# Patient Record
Sex: MALE | Race: BLACK OR AFRICAN AMERICAN | NOT HISPANIC OR LATINO | URBAN - METROPOLITAN AREA
[De-identification: names, ages, dates, MRNs, and addresses within clinical notes are randomized per-mention and may not be internally consistent; named-entity substitution may affect disease eponyms.]

---

## 2022-08-21 ENCOUNTER — EMERGENCY (EMERGENCY)
Facility: HOSPITAL | Age: 52
LOS: 0 days | Discharge: HOME | End: 2022-08-21
Attending: STUDENT IN AN ORGANIZED HEALTH CARE EDUCATION/TRAINING PROGRAM

## 2022-08-21 VITALS
OXYGEN SATURATION: 99 % | HEART RATE: 76 BPM | SYSTOLIC BLOOD PRESSURE: 170 MMHG | DIASTOLIC BLOOD PRESSURE: 100 MMHG | TEMPERATURE: 98 F | RESPIRATION RATE: 16 BRPM | WEIGHT: 265 LBS

## 2022-08-21 VITALS
RESPIRATION RATE: 18 BRPM | OXYGEN SATURATION: 98 % | TEMPERATURE: 98 F | DIASTOLIC BLOOD PRESSURE: 89 MMHG | SYSTOLIC BLOOD PRESSURE: 164 MMHG | HEART RATE: 77 BPM

## 2022-08-21 DIAGNOSIS — M25.562 PAIN IN LEFT KNEE: ICD-10-CM
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
## 2022-08-21 PROCEDURE — 73562 X-RAY EXAM OF KNEE 3: CPT | Mod: 26,LT

## 2022-08-21 PROCEDURE — 73552 X-RAY EXAM OF FEMUR 2/>: CPT | Mod: 26,50

## 2022-08-21 PROCEDURE — 73590 X-RAY EXAM OF LOWER LEG: CPT | Mod: 26,LT

## 2022-08-21 PROCEDURE — 99284 EMERGENCY DEPT VISIT MOD MDM: CPT

## 2022-08-21 RX ORDER — IBUPROFEN 200 MG
1 TABLET ORAL
Qty: 30 | Refills: 0
Start: 2022-08-21 | End: 2022-08-25

## 2022-08-21 RX ORDER — IBUPROFEN 200 MG
600 TABLET ORAL ONCE
Refills: 0 | Status: COMPLETED | OUTPATIENT
Start: 2022-08-21 | End: 2022-08-21

## 2022-08-21 RX ADMIN — Medication 600 MILLIGRAM(S): at 18:06

## 2022-08-21 NOTE — ED PROVIDER NOTE - PHYSICAL EXAMINATION
GEN: Awake, alert, interactive, NAD.  HEAD AND NECK: NC/AT. Airway patent. Neck supple.   EYES:  Clear b/l. EOMI. PERRL.   ENT: Moist mucus membranes.   CARDIAC: Regular rate, regular rhythm. No evident pedal edema.    RESP/CHEST: Normal respiratory effort with no use of accessory muscles or retractions. Clear throughout on auscultation.  ABD: Soft, non-distended, non-tender. No rebound, no guarding.   BACK: No midline spinal TTP. No CVAT.   EXTREMITIES: LLE NVI, + TTP suprapatellar / medial / lateral knee, no TTP patella / inferior / posterior L knee.   SKIN: Warm, dry, intact normal color. No rash.   NEURO: AOx3, CN II-XII grossly intact, no focal deficits.   PSYCH: Appropriate mood and affect.

## 2022-08-21 NOTE — ED PROVIDER NOTE - PATIENT PORTAL LINK FT
You can access the FollowMyHealth Patient Portal offered by [facility] by registering at the following website: [email or web address]. By joining [employer]’s FollowMyHealth portal, you will also be able to view your health information using other applications (apps) compatible with our system.

## 2022-08-21 NOTE — ED PROVIDER NOTE - CLINICAL SUMMARY MEDICAL DECISION MAKING FREE TEXT BOX
Otherwise healthy 52M pw L knee pain s/p basketball injury PTA. AF, VSS. Well appearing, in NAD. Exam as noted in PE. Plan: XR LLE, pain control. Re-eval.
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
## 2022-08-21 NOTE — ED ADULT TRIAGE NOTE - CHIEF COMPLAINT QUOTE
while playing basketball pt fell and hurt left knee. no deformity, increase pain wt bearing Ice applied in triage

## 2022-08-21 NOTE — ED PROVIDER NOTE - CARE PROVIDER_API CALL
iGgi Salamanca (DO)  Orthopaedic Surgery Surgery  [address] [facility], [address] [place] [address]  Phone: [phone]  Fax: [phone]  Follow Up Time: 7-10 Days

## 2022-08-21 NOTE — ED PROVIDER NOTE - PROGRESS NOTE DETAILS
XR imaging w/o evidence acute fracture or dislocation. On re-eval, resting comfortably, in NAD. Clinical presentation most c/w sprain. Ace wrap applied to L knee, given crutches for comfort / support. Stable for d/c home. Given script Motrin. Recommend continued NSAIDs, RICE. Given and instructed for outpatient Ortho f/u. Return signs / symptoms d/w pt, wife. They understand / agree w/ this plan.

## 2024-04-04 ENCOUNTER — INPATIENT (INPATIENT)
Facility: HOSPITAL | Age: 54
LOS: 10 days | Discharge: HOME | End: 2024-04-15
Attending: INTERNAL MEDICINE | Admitting: INTERNAL MEDICINE
Payer: COMMERCIAL

## 2024-04-04 VITALS
HEART RATE: 95 BPM | DIASTOLIC BLOOD PRESSURE: 94 MMHG | OXYGEN SATURATION: 99 % | TEMPERATURE: 99 F | SYSTOLIC BLOOD PRESSURE: 149 MMHG | RESPIRATION RATE: 20 BRPM

## 2024-04-04 DIAGNOSIS — R29.898 OTHER SYMPTOMS AND SIGNS INVOLVING THE MUSCULOSKELETAL SYSTEM: Chronic | ICD-10-CM
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
## 2024-04-04 DIAGNOSIS — Z79.899 OTHER LONG TERM (CURRENT) DRUG THERAPY: ICD-10-CM

## 2024-04-04 DIAGNOSIS — I26.99 OTHER PULMONARY EMBOLISM WITHOUT ACUTE COR PULMONALE: ICD-10-CM

## 2024-04-04 DIAGNOSIS — T14.8XXA OTHER INJURY OF UNSPECIFIED BODY REGION, INITIAL ENCOUNTER: ICD-10-CM

## 2024-04-04 DIAGNOSIS — L08.9 LOCAL INFECTION OF THE SKIN AND SUBCUTANEOUS TISSUE, UNSPECIFIED: ICD-10-CM
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
## 2024-04-04 LAB
ALBUMIN SERPL ELPH-MCNC: 3.7 G/DL — SIGNIFICANT CHANGE UP (ref 3.3–5)
ALP SERPL-CCNC: 286 U/L — HIGH (ref 40–120)
ALT FLD-CCNC: 27 U/L — SIGNIFICANT CHANGE UP (ref 4–41)
ANION GAP SERPL CALC-SCNC: 12 MMOL/L — SIGNIFICANT CHANGE UP (ref 7–14)
APTT BLD: 30.2 SEC — SIGNIFICANT CHANGE UP (ref 24.5–35.6)
AST SERPL-CCNC: 27 U/L — SIGNIFICANT CHANGE UP (ref 4–40)
BASE EXCESS BLDV CALC-SCNC: 1.2 MMOL/L — SIGNIFICANT CHANGE UP (ref -2–3)
BASOPHILS # BLD AUTO: 0.02 K/UL — SIGNIFICANT CHANGE UP (ref 0–0.2)
BASOPHILS NFR BLD AUTO: 0.2 % — SIGNIFICANT CHANGE UP (ref 0–2)
BILIRUB SERPL-MCNC: 0.7 MG/DL — SIGNIFICANT CHANGE UP (ref 0.2–1.2)
BUN SERPL-MCNC: 13 MG/DL — SIGNIFICANT CHANGE UP (ref 7–23)
CA-I SERPL-SCNC: 1.19 MMOL/L — SIGNIFICANT CHANGE UP (ref 1.15–1.33)
CALCIUM SERPL-MCNC: 9.3 MG/DL — SIGNIFICANT CHANGE UP (ref 8.4–10.5)
CHLORIDE BLDV-SCNC: 102 MMOL/L — SIGNIFICANT CHANGE UP (ref 96–108)
CHLORIDE SERPL-SCNC: 102 MMOL/L — SIGNIFICANT CHANGE UP (ref 98–107)
CO2 BLDV-SCNC: 27.3 MMOL/L — HIGH (ref 22–26)
CO2 SERPL-SCNC: 23 MMOL/L — SIGNIFICANT CHANGE UP (ref 22–31)
CREAT SERPL-MCNC: 0.9 MG/DL — SIGNIFICANT CHANGE UP (ref 0.5–1.3)
CRP SERPL-MCNC: 25.1 MG/L — HIGH
EGFR: 102 ML/MIN/1.73M2 — SIGNIFICANT CHANGE UP
EOSINOPHIL # BLD AUTO: 0.14 K/UL — SIGNIFICANT CHANGE UP (ref 0–0.5)
EOSINOPHIL NFR BLD AUTO: 1.5 % — SIGNIFICANT CHANGE UP (ref 0–6)
ERYTHROCYTE [SEDIMENTATION RATE] IN BLOOD: 92 MM/HR — HIGH (ref 1–15)
GAS PNL BLDV: 134 MMOL/L — LOW (ref 136–145)
GAS PNL BLDV: SIGNIFICANT CHANGE UP
GAS PNL BLDV: SIGNIFICANT CHANGE UP
GLUCOSE BLDV-MCNC: 101 MG/DL — HIGH (ref 70–99)
GLUCOSE SERPL-MCNC: 100 MG/DL — HIGH (ref 70–99)
HCO3 BLDV-SCNC: 26 MMOL/L — SIGNIFICANT CHANGE UP (ref 22–29)
HCT VFR BLD CALC: 34.4 % — LOW (ref 39–50)
HCT VFR BLDA CALC: 35 % — LOW (ref 39–51)
HGB BLD CALC-MCNC: 11.5 G/DL — LOW (ref 12.6–17.4)
HGB BLD-MCNC: 10.9 G/DL — LOW (ref 13–17)
IANC: 6.91 K/UL — SIGNIFICANT CHANGE UP (ref 1.8–7.4)
IMM GRANULOCYTES NFR BLD AUTO: 0.3 % — SIGNIFICANT CHANGE UP (ref 0–0.9)
INR BLD: 1.24 RATIO — HIGH (ref 0.85–1.18)
LACTATE BLDV-MCNC: 1.9 MMOL/L — SIGNIFICANT CHANGE UP (ref 0.5–2)
LYMPHOCYTES # BLD AUTO: 1.71 K/UL — SIGNIFICANT CHANGE UP (ref 1–3.3)
LYMPHOCYTES # BLD AUTO: 17.8 % — SIGNIFICANT CHANGE UP (ref 13–44)
MCHC RBC-ENTMCNC: 26.7 PG — LOW (ref 27–34)
MCHC RBC-ENTMCNC: 31.7 GM/DL — LOW (ref 32–36)
MCV RBC AUTO: 84.3 FL — SIGNIFICANT CHANGE UP (ref 80–100)
MONOCYTES # BLD AUTO: 0.78 K/UL — SIGNIFICANT CHANGE UP (ref 0–0.9)
MONOCYTES NFR BLD AUTO: 8.1 % — SIGNIFICANT CHANGE UP (ref 2–14)
NEUTROPHILS # BLD AUTO: 6.91 K/UL — SIGNIFICANT CHANGE UP (ref 1.8–7.4)
NEUTROPHILS NFR BLD AUTO: 72.1 % — SIGNIFICANT CHANGE UP (ref 43–77)
NRBC # BLD: 0 /100 WBCS — SIGNIFICANT CHANGE UP (ref 0–0)
NRBC # FLD: 0 K/UL — SIGNIFICANT CHANGE UP (ref 0–0)
PCO2 BLDV: 41 MMHG — LOW (ref 42–55)
PH BLDV: 7.41 — SIGNIFICANT CHANGE UP (ref 7.32–7.43)
PLATELET # BLD AUTO: 611 K/UL — HIGH (ref 150–400)
PO2 BLDV: 65 MMHG — HIGH (ref 25–45)
POTASSIUM BLDV-SCNC: 6.1 MMOL/L — HIGH (ref 3.5–5.1)
POTASSIUM SERPL-MCNC: 4.7 MMOL/L — SIGNIFICANT CHANGE UP (ref 3.5–5.3)
POTASSIUM SERPL-SCNC: 4.7 MMOL/L — SIGNIFICANT CHANGE UP (ref 3.5–5.3)
PROT SERPL-MCNC: 7.7 G/DL — SIGNIFICANT CHANGE UP (ref 6–8.3)
PROTHROM AB SERPL-ACNC: 13.9 SEC — HIGH (ref 9.5–13)
RBC # BLD: 4.08 M/UL — LOW (ref 4.2–5.8)
RBC # FLD: 13.8 % — SIGNIFICANT CHANGE UP (ref 10.3–14.5)
SAO2 % BLDV: 91.5 % — HIGH (ref 67–88)
SODIUM SERPL-SCNC: 137 MMOL/L — SIGNIFICANT CHANGE UP (ref 135–145)
WBC # BLD: 9.59 K/UL — SIGNIFICANT CHANGE UP (ref 3.8–10.5)
WBC # FLD AUTO: 9.59 K/UL — SIGNIFICANT CHANGE UP (ref 3.8–10.5)

## 2024-04-04 PROCEDURE — 99285 EMERGENCY DEPT VISIT HI MDM: CPT

## 2024-04-04 PROCEDURE — 73206 CT ANGIO UPR EXTRM W/O&W/DYE: CPT | Mod: 26,LT,52,MC

## 2024-04-04 PROCEDURE — 99223 1ST HOSP IP/OBS HIGH 75: CPT

## 2024-04-04 RX ORDER — GABAPENTIN 400 MG/1
300 CAPSULE ORAL EVERY 8 HOURS
Refills: 0 | Status: DISCONTINUED | OUTPATIENT
Start: 2024-04-04 | End: 2024-04-15

## 2024-04-04 RX ORDER — VANCOMYCIN HCL 1 G
1000 VIAL (EA) INTRAVENOUS ONCE
Refills: 0 | Status: COMPLETED | OUTPATIENT
Start: 2024-04-04 | End: 2024-04-04

## 2024-04-04 RX ORDER — SODIUM CHLORIDE 9 MG/ML
1000 INJECTION, SOLUTION INTRAVENOUS ONCE
Refills: 0 | Status: COMPLETED | OUTPATIENT
Start: 2024-04-04 | End: 2024-04-04

## 2024-04-04 RX ORDER — OXYCODONE HYDROCHLORIDE 5 MG/1
5 TABLET ORAL EVERY 8 HOURS
Refills: 0 | Status: DISCONTINUED | OUTPATIENT
Start: 2024-04-04 | End: 2024-04-11

## 2024-04-04 RX ORDER — MORPHINE SULFATE 50 MG/1
4 CAPSULE, EXTENDED RELEASE ORAL ONCE
Refills: 0 | Status: DISCONTINUED | OUTPATIENT
Start: 2024-04-04 | End: 2024-04-04

## 2024-04-04 RX ORDER — SODIUM CHLORIDE 9 MG/ML
1000 INJECTION INTRAMUSCULAR; INTRAVENOUS; SUBCUTANEOUS
Refills: 0 | Status: DISCONTINUED | OUTPATIENT
Start: 2024-04-04 | End: 2024-04-15

## 2024-04-04 RX ORDER — ACETAMINOPHEN 500 MG
650 TABLET ORAL EVERY 6 HOURS
Refills: 0 | Status: DISCONTINUED | OUTPATIENT
Start: 2024-04-04 | End: 2024-04-05

## 2024-04-04 RX ORDER — KETOROLAC TROMETHAMINE 30 MG/ML
30 SYRINGE (ML) INJECTION ONCE
Refills: 0 | Status: DISCONTINUED | OUTPATIENT
Start: 2024-04-04 | End: 2024-04-04

## 2024-04-04 RX ORDER — APIXABAN 2.5 MG/1
5 TABLET, FILM COATED ORAL EVERY 12 HOURS
Refills: 0 | Status: DISCONTINUED | OUTPATIENT
Start: 2024-04-04 | End: 2024-04-07

## 2024-04-04 RX ADMIN — Medication 250 MILLIGRAM(S): at 15:04

## 2024-04-04 RX ADMIN — MORPHINE SULFATE 4 MILLIGRAM(S): 50 CAPSULE, EXTENDED RELEASE ORAL at 13:31

## 2024-04-04 RX ADMIN — GABAPENTIN 300 MILLIGRAM(S): 400 CAPSULE ORAL at 22:32

## 2024-04-04 RX ADMIN — SODIUM CHLORIDE 1000 MILLILITER(S): 9 INJECTION, SOLUTION INTRAVENOUS at 13:57

## 2024-04-04 RX ADMIN — Medication 30 MILLIGRAM(S): at 16:29

## 2024-04-04 RX ADMIN — SODIUM CHLORIDE 100 MILLILITER(S): 9 INJECTION INTRAMUSCULAR; INTRAVENOUS; SUBCUTANEOUS at 22:33

## 2024-04-04 RX ADMIN — Medication 100 MILLIGRAM(S): at 13:59

## 2024-04-04 RX ADMIN — MORPHINE SULFATE 4 MILLIGRAM(S): 50 CAPSULE, EXTENDED RELEASE ORAL at 16:45

## 2024-04-04 RX ADMIN — Medication 100 MILLIGRAM(S): at 22:33

## 2024-04-04 RX ADMIN — MORPHINE SULFATE 4 MILLIGRAM(S): 50 CAPSULE, EXTENDED RELEASE ORAL at 14:01

## 2024-04-04 NOTE — PATIENT PROFILE ADULT - FALL HARM RISK - HARM RISK INTERVENTIONS
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
## 2024-04-04 NOTE — ED PROVIDER NOTE - PHYSICAL EXAMINATION
GENERAL: well appearing in no acute distress, non-toxic appearing  CARDIAC: regular rate and rhythm, normal S1S2, no appreciable murmurs, 2+ pulses in UE/LE b/l  PULM: normal breath sounds, clear to ascultation bilaterally, no rales, rhonchi, wheezing  GI: abdomen nondistended, soft, nontender, no guarding, rebound tenderness  : no CVA tenderness b/l, no suprapubic tenderness  NEURO: neurovascularly intact LUE, able to move fingers, sensory intact in median/radian and ulnar distribution  SKIN: weeping well demarcated wound serosanguinous drainage induration up to mid arm GENERAL: well appearing in no acute distress, non-toxic appearing  CARDIAC: regular rate and rhythm, normal S1S2, no appreciable murmurs, 2+ pulses in UE/LE b/l  PULM: normal breath sounds, clear to ascultation bilaterally, no rales, rhonchi, wheezing  GI: abdomen nondistended, soft, nontender, no guarding, rebound tenderness  : no CVA tenderness b/l, no suprapubic tenderness  NEURO: neurovascularly intact LUE, able to move fingers, sensory intact in median/radian and ulnar distribution  SKIN: weeping well demarcated wound serosanguinous drainage induration up to mid arm    Attending/Ananya: NAD; PERRL/EOMI, non-icterus, supple, no LENNY, no JVD, RRR, CTAB; Abd-soft, NT/ND, no HSM; no LE edema, A&Ox3, nonfocal; Rt hand-+dorsal eschar, no d/c, +swelling to distal UE, cap refill < 2 sec, sensory-motor intact.
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
## 2024-04-04 NOTE — H&P ADULT - PROBLEM SELECTOR PLAN 1
-c/w clindamycin  -wound care  -pain control  -would call plastics in am for wound management as well as stitches  removal from forehead; had planned stiches removal tomorrow at [facility]
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
## 2024-04-04 NOTE — H&P ADULT - PROBLEM SELECTOR PLAN 3
The patient was referred by Dr. Mike Rock for colonoscopy consult.   A copy of this document is being forwarded to the referring provider. The patient has never had a colonoscopy and denies any known family h/o GI disease or malignancy. He reports persistent  intermittent episodes over the pats 5 months of lower abdominal pressure followed by passage of gas and mucus which may occur several times in a day; often goes several days between episodes; denies any obvious blood. These episdoes have happened in the middle of the night. He otherwise is having fairly normal bowel movements, and occasionally diarrhea. He denies any abdominal pain; does report weight loss of 50 pounds over the last year and a half and feels he has improved his diet. He also mentions a few weeks of upper abdominal burning which has since resolved but continues to feel some distention in the left upper abdomen; takes Aleve a few times per week. Lastly, he mentions an abnormality in the rectal area which has been present for 2 years with no pain, but felt to bulge out; recent rectal/prostate exam by PCP and felt to be possibly external hemorrhoids per review of note. He admits to being homosexual. CBC and CMP this month were unremarkable; he was started on cholesterol medication and told he was pre-diabetic (Hgb A1C 5.8 with upper limit of normal being 5.6).
-c/w eliquis 5 mg bid

## 2024-04-04 NOTE — H&P ADULT - NSHPREVIEWOFSYSTEMS_GEN_ALL_CORE
Review of Systems:   CONSTITUTIONAL: No fever  EYES: No eye pain, visual disturbances, or discharge  ENMT:  No difficulty hearing, tinnitus, vertigo; No sinus or throat pain  NECK: No pain or stiffness  RESPIRATORY: No cough, wheezing, chills or hemoptysis; No shortness of breath  CARDIOVASCULAR: No chest pain, palpitations, dizziness, or leg swelling  GASTROINTESTINAL: No abdominal or epigastric pain. No nausea, vomiting, or hematemesis; No diarrhea or constipation. No melena or hematochezia.  GENITOURINARY: No dysuria, frequency, hematuria, or incontinence  NEUROLOGICAL: No headaches, memory loss, loss of strength, numbness, or tremors  SKIN: No itching, burning, rashes, or lesions   MUSCULOSKELETAL: improving wrist/hand/digit swelling and pain

## 2024-04-04 NOTE — ED ADULT NURSE REASSESSMENT NOTE - NS ED NURSE REASSESS COMMENT FT1
Pt respirations even and unlabored. pt denies headaches, dizziness, n/v/d, abdominal pain, SOB, chest pain, or fever like symptoms. Pt medicated as prescribed. Plan of care ongoing.
pt respirations even and unlabored. pt denies headaches, dizziness, n/v/d, abdominal pain, SOB, chest pain, or fever like symptoms. Pt endorses reduced level of pain in his hand. Plan of care ongoing.
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
## 2024-04-04 NOTE — ED ADULT NURSE NOTE - NSSEPSISSUSPECTED_ED_A_ED
No clitoris and vaginal anatomy normal, absent significant discharge or tags; no masses; no hernias.
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
## 2024-04-04 NOTE — H&P ADULT - NSHPPHYSICALEXAM_GEN_ALL_CORE
PHYSICAL EXAM:      Constitutional: NAD, well-groomed, well-developed  HEENT:  EOMI, Normal Hearing, stitches on lower forehead   Neck: No LAD, No JVD  Back: Normal spine flexure, No CVA tenderness  Respiratory: CTAB  Cardiovascular: S1 and S2, RRR, no M/G/R  Gastrointestinal: BS+, soft, NT/ND  Extremities: + left upper extremity  edema  Vascular: 2+ peripheral pulses, left RA mildly weaker  Neurological: A/O x 3, no focal deficits  Psychiatric: Normal mood, normal affect  Musculoskeletal: 5/5 strength b/l upper and lower extremities  Skin: +dorsal eschar, no d/c, +swelling to distal UE as well as fingers, hand, wrist

## 2024-04-04 NOTE — H&P ADULT - NSHPLABSRESULTS_GEN_ALL_CORE
10.9   9.59  )-----------( 611      ( 04 Apr 2024 13:49 )             34.4     04-04    137  |  102  |  13  ----------------------------<  100<H>  4.7   |  23  |  0.90    Ca    9.3      04 Apr 2024 13:49    TPro  7.7  /  Alb  3.7  /  TBili  0.7  /  DBili  x   /  AST  27  /  ALT  27  /  AlkPhos  286<H>  04-04    CAPILLARY BLOOD GLUCOSE        PT/INR - ( 04 Apr 2024 13:49 )   PT: 13.9 sec;   INR: 1.24 ratio         PTT - ( 04 Apr 2024 13:49 )  PTT:30.2 sec  Urinalysis Basic - ( 04 Apr 2024 13:49 )    Color: x / Appearance: x / SG: x / pH: x  Gluc: 100 mg/dL / Ketone: x  / Bili: x / Urobili: x   Blood: x / Protein: x / Nitrite: x   Leuk Esterase: x / RBC: x / WBC x   Sq Epi: x / Non Sq Epi: x / Bacteria: x      Vital Signs Last 24 Hrs  T(C): 36.8 (04 Apr 2024 19:21), Max: 37.1 (04 Apr 2024 11:54)  T(F): 98.3 (04 Apr 2024 19:21), Max: 98.7 (04 Apr 2024 11:54)  HR: 96 (04 Apr 2024 19:21) (80 - 96)  BP: 130/79 (04 Apr 2024 19:21) (130/79 - 156/91)  BP(mean): 110 (04 Apr 2024 15:07) (110 - 110)  RR: 18 (04 Apr 2024 19:21) (18 - 20)  SpO2: 95% (04 Apr 2024 19:21) (95% - 100%)    Parameters below as of 04 Apr 2024 19:21  Patient On (Oxygen Delivery Method): room air

## 2024-04-04 NOTE — ED PROVIDER NOTE - CLINICAL SUMMARY MEDICAL DECISION MAKING FREE TEXT BOX
54 yo presents for L hand wound s/p IV in that arm during recent hospitalization, no fevers, chills or signs of systemic infection. presents w significant pain, serosanguinous drainage, induration up to mid arm. motor/sensory and neurovascularly intact. CT to eval for nec fasc, no concern for vascular compromise at this time. blood cultures, abx, pain management.

## 2024-04-05 LAB
ALBUMIN SERPL ELPH-MCNC: 1.7 G/DL — LOW (ref 3.3–5)
ALP SERPL-CCNC: 121 U/L — HIGH (ref 40–120)
ALT FLD-CCNC: 9 U/L — SIGNIFICANT CHANGE UP (ref 4–41)
ANION GAP SERPL CALC-SCNC: 14 MMOL/L — SIGNIFICANT CHANGE UP (ref 7–14)
ANION GAP SERPL CALC-SCNC: 8 MMOL/L — SIGNIFICANT CHANGE UP (ref 7–14)
AST SERPL-CCNC: 6 U/L — SIGNIFICANT CHANGE UP (ref 4–40)
BASOPHILS # BLD AUTO: 0.03 K/UL — SIGNIFICANT CHANGE UP (ref 0–0.2)
BASOPHILS NFR BLD AUTO: 0.5 % — SIGNIFICANT CHANGE UP (ref 0–2)
BILIRUB SERPL-MCNC: 0.4 MG/DL — SIGNIFICANT CHANGE UP (ref 0.2–1.2)
BUN SERPL-MCNC: 13 MG/DL — SIGNIFICANT CHANGE UP (ref 7–23)
BUN SERPL-MCNC: 8 MG/DL — SIGNIFICANT CHANGE UP (ref 7–23)
CALCIUM SERPL-MCNC: 4.4 MG/DL — CRITICAL LOW (ref 8.4–10.5)
CALCIUM SERPL-MCNC: 9.2 MG/DL — SIGNIFICANT CHANGE UP (ref 8.4–10.5)
CHLORIDE SERPL-SCNC: 100 MMOL/L — SIGNIFICANT CHANGE UP (ref 98–107)
CHLORIDE SERPL-SCNC: 124 MMOL/L — HIGH (ref 98–107)
CO2 SERPL-SCNC: 13 MMOL/L — LOW (ref 22–31)
CO2 SERPL-SCNC: 25 MMOL/L — SIGNIFICANT CHANGE UP (ref 22–31)
CREAT SERPL-MCNC: 0.46 MG/DL — LOW (ref 0.5–1.3)
CREAT SERPL-MCNC: 1.13 MG/DL — SIGNIFICANT CHANGE UP (ref 0.5–1.3)
EGFR: 125 ML/MIN/1.73M2 — SIGNIFICANT CHANGE UP
EGFR: 78 ML/MIN/1.73M2 — SIGNIFICANT CHANGE UP
EOSINOPHIL # BLD AUTO: 0.15 K/UL — SIGNIFICANT CHANGE UP (ref 0–0.5)
EOSINOPHIL NFR BLD AUTO: 2.4 % — SIGNIFICANT CHANGE UP (ref 0–6)
GLUCOSE SERPL-MCNC: 107 MG/DL — HIGH (ref 70–99)
GLUCOSE SERPL-MCNC: 55 MG/DL — LOW (ref 70–99)
HCT VFR BLD CALC: 26.3 % — LOW (ref 39–50)
HCT VFR BLD CALC: 31.9 % — LOW (ref 39–50)
HGB BLD-MCNC: 10.1 G/DL — LOW (ref 13–17)
HGB BLD-MCNC: 8.5 G/DL — LOW (ref 13–17)
IANC: 4.09 K/UL — SIGNIFICANT CHANGE UP (ref 1.8–7.4)
IMM GRANULOCYTES NFR BLD AUTO: 0.5 % — SIGNIFICANT CHANGE UP (ref 0–0.9)
LYMPHOCYTES # BLD AUTO: 1.3 K/UL — SIGNIFICANT CHANGE UP (ref 1–3.3)
LYMPHOCYTES # BLD AUTO: 20.9 % — SIGNIFICANT CHANGE UP (ref 13–44)
MAGNESIUM SERPL-MCNC: 1 MG/DL — CRITICAL LOW (ref 1.6–2.6)
MAGNESIUM SERPL-MCNC: 2.1 MG/DL — SIGNIFICANT CHANGE UP (ref 1.6–2.6)
MCHC RBC-ENTMCNC: 27.3 PG — SIGNIFICANT CHANGE UP (ref 27–34)
MCHC RBC-ENTMCNC: 27.7 PG — SIGNIFICANT CHANGE UP (ref 27–34)
MCHC RBC-ENTMCNC: 31.7 GM/DL — LOW (ref 32–36)
MCHC RBC-ENTMCNC: 32.3 GM/DL — SIGNIFICANT CHANGE UP (ref 32–36)
MCV RBC AUTO: 85.7 FL — SIGNIFICANT CHANGE UP (ref 80–100)
MCV RBC AUTO: 86.2 FL — SIGNIFICANT CHANGE UP (ref 80–100)
MONOCYTES # BLD AUTO: 0.61 K/UL — SIGNIFICANT CHANGE UP (ref 0–0.9)
MONOCYTES NFR BLD AUTO: 9.8 % — SIGNIFICANT CHANGE UP (ref 2–14)
MRSA PCR RESULT.: SIGNIFICANT CHANGE UP
NEUTROPHILS # BLD AUTO: 4.09 K/UL — SIGNIFICANT CHANGE UP (ref 1.8–7.4)
NEUTROPHILS NFR BLD AUTO: 65.9 % — SIGNIFICANT CHANGE UP (ref 43–77)
NRBC # BLD: 0 /100 WBCS — SIGNIFICANT CHANGE UP (ref 0–0)
NRBC # BLD: 0 /100 WBCS — SIGNIFICANT CHANGE UP (ref 0–0)
NRBC # FLD: 0 K/UL — SIGNIFICANT CHANGE UP (ref 0–0)
NRBC # FLD: 0 K/UL — SIGNIFICANT CHANGE UP (ref 0–0)
PHOSPHATE SERPL-MCNC: 4.9 MG/DL — HIGH (ref 2.5–4.5)
PLATELET # BLD AUTO: 447 K/UL — HIGH (ref 150–400)
PLATELET # BLD AUTO: 530 K/UL — HIGH (ref 150–400)
POTASSIUM SERPL-MCNC: 2.2 MMOL/L — CRITICAL LOW (ref 3.5–5.3)
POTASSIUM SERPL-MCNC: 4.6 MMOL/L — SIGNIFICANT CHANGE UP (ref 3.5–5.3)
POTASSIUM SERPL-SCNC: 2.2 MMOL/L — CRITICAL LOW (ref 3.5–5.3)
POTASSIUM SERPL-SCNC: 4.6 MMOL/L — SIGNIFICANT CHANGE UP (ref 3.5–5.3)
PROT SERPL-MCNC: 3.4 G/DL — LOW (ref 6–8.3)
RBC # BLD: 3.07 M/UL — LOW (ref 4.2–5.8)
RBC # BLD: 3.7 M/UL — LOW (ref 4.2–5.8)
RBC # FLD: 13.6 % — SIGNIFICANT CHANGE UP (ref 10.3–14.5)
RBC # FLD: 13.6 % — SIGNIFICANT CHANGE UP (ref 10.3–14.5)
S AUREUS DNA NOSE QL NAA+PROBE: SIGNIFICANT CHANGE UP
SODIUM SERPL-SCNC: 139 MMOL/L — SIGNIFICANT CHANGE UP (ref 135–145)
SODIUM SERPL-SCNC: 145 MMOL/L — SIGNIFICANT CHANGE UP (ref 135–145)
WBC # BLD: 6.21 K/UL — SIGNIFICANT CHANGE UP (ref 3.8–10.5)
WBC # BLD: 8.17 K/UL — SIGNIFICANT CHANGE UP (ref 3.8–10.5)
WBC # FLD AUTO: 6.21 K/UL — SIGNIFICANT CHANGE UP (ref 3.8–10.5)
WBC # FLD AUTO: 8.17 K/UL — SIGNIFICANT CHANGE UP (ref 3.8–10.5)

## 2024-04-05 PROCEDURE — 73110 X-RAY EXAM OF WRIST: CPT | Mod: 26,LT

## 2024-04-05 PROCEDURE — 73120 X-RAY EXAM OF HAND: CPT | Mod: 26,LT

## 2024-04-05 PROCEDURE — 99223 1ST HOSP IP/OBS HIGH 75: CPT

## 2024-04-05 PROCEDURE — 73200 CT UPPER EXTREMITY W/O DYE: CPT | Mod: 26,RT

## 2024-04-05 PROCEDURE — 73030 X-RAY EXAM OF SHOULDER: CPT | Mod: 26,50

## 2024-04-05 RX ORDER — ACETAMINOPHEN 500 MG
650 TABLET ORAL EVERY 6 HOURS
Refills: 0 | Status: DISCONTINUED | OUTPATIENT
Start: 2024-04-05 | End: 2024-04-15

## 2024-04-05 RX ORDER — ACETAMINOPHEN 500 MG
975 TABLET ORAL ONCE
Refills: 0 | Status: COMPLETED | OUTPATIENT
Start: 2024-04-05 | End: 2024-04-05

## 2024-04-05 RX ORDER — BACITRACIN ZINC 500 UNIT/G
1 OINTMENT IN PACKET (EA) TOPICAL
Refills: 0 | Status: DISCONTINUED | OUTPATIENT
Start: 2024-04-05 | End: 2024-04-15

## 2024-04-05 RX ORDER — METOPROLOL TARTRATE 50 MG
25 TABLET ORAL EVERY 8 HOURS
Refills: 0 | Status: DISCONTINUED | OUTPATIENT
Start: 2024-04-05 | End: 2024-04-15

## 2024-04-05 RX ORDER — VANCOMYCIN HCL 1 G
1250 VIAL (EA) INTRAVENOUS EVERY 12 HOURS
Refills: 0 | Status: DISCONTINUED | OUTPATIENT
Start: 2024-04-05 | End: 2024-04-07

## 2024-04-05 RX ORDER — CHLORHEXIDINE GLUCONATE 213 G/1000ML
1 SOLUTION TOPICAL
Refills: 0 | Status: DISCONTINUED | OUTPATIENT
Start: 2024-04-05 | End: 2024-04-15

## 2024-04-05 RX ORDER — BACITRACIN ZINC 500 UNIT/G
1 OINTMENT IN PACKET (EA) TOPICAL DAILY
Refills: 0 | Status: DISCONTINUED | OUTPATIENT
Start: 2024-04-05 | End: 2024-04-14

## 2024-04-05 RX ADMIN — APIXABAN 5 MILLIGRAM(S): 2.5 TABLET, FILM COATED ORAL at 19:04

## 2024-04-05 RX ADMIN — Medication 100 MILLIGRAM(S): at 06:29

## 2024-04-05 RX ADMIN — APIXABAN 5 MILLIGRAM(S): 2.5 TABLET, FILM COATED ORAL at 06:28

## 2024-04-05 RX ADMIN — Medication 166.67 MILLIGRAM(S): at 19:36

## 2024-04-05 RX ADMIN — OXYCODONE HYDROCHLORIDE 5 MILLIGRAM(S): 5 TABLET ORAL at 16:00

## 2024-04-05 RX ADMIN — Medication 100 MILLIGRAM(S): at 13:32

## 2024-04-05 RX ADMIN — Medication 975 MILLIGRAM(S): at 22:32

## 2024-04-05 RX ADMIN — GABAPENTIN 300 MILLIGRAM(S): 400 CAPSULE ORAL at 22:33

## 2024-04-05 RX ADMIN — GABAPENTIN 300 MILLIGRAM(S): 400 CAPSULE ORAL at 06:28

## 2024-04-05 RX ADMIN — OXYCODONE HYDROCHLORIDE 5 MILLIGRAM(S): 5 TABLET ORAL at 07:33

## 2024-04-05 RX ADMIN — Medication 1 APPLICATION(S): at 19:05

## 2024-04-05 RX ADMIN — OXYCODONE HYDROCHLORIDE 5 MILLIGRAM(S): 5 TABLET ORAL at 04:59

## 2024-04-05 RX ADMIN — OXYCODONE HYDROCHLORIDE 5 MILLIGRAM(S): 5 TABLET ORAL at 16:39

## 2024-04-05 RX ADMIN — GABAPENTIN 300 MILLIGRAM(S): 400 CAPSULE ORAL at 13:32

## 2024-04-05 RX ADMIN — CHLORHEXIDINE GLUCONATE 1 APPLICATION(S): 213 SOLUTION TOPICAL at 06:43
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
## 2024-04-05 NOTE — PHYSICAL THERAPY INITIAL EVALUATION ADULT - GENERAL OBSERVATIONS, REHAB EVAL
Patient received semi supine in bed with all lines in tact in NAD Patient received semi supine in bed with all lines in tact in NAD. Blood pressure = 139/88.
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
## 2024-04-05 NOTE — CONSULT NOTE ADULT - ASSESSMENT
53M with PMH PE on Keyur presenting with left hand wound. Afebrile, hemodynamically stable. Exam demonstrates reduced range of motion in affected hand but intact sensation and pulses. Wound is foul smelling with minimal purulent discharge. Lab findings demonstrate elevated acute phase reactants, pending cultures and MRSA. Imaging demonstrates edema and soft tissue defect but not free air or fluid collection. Pt. currently on Clindamycin.     Recommendation *to be discussed*  53M with PMH PE on Keyur presenting with left hand wound. Afebrile, hemodynamically stable. Exam demonstrates reduced range of motion in affected hand but intact sensation and pulses. Wound is foul smelling with minimal purulent discharge. Lab findings demonstrate elevated acute phase reactants, pending cultures and MRSA. Imaging demonstrates edema and soft tissue defect but not free air or fluid collection. Pt. currently on Clindamycin.     Recommendations  1. Recommend Vancomycin 1250 q12  2. Recommend DVT study  3. F/u wound care and surgery recommendations  4. F/u Blood cultures, MRSA     Recommendations not final until attending attestation

## 2024-04-05 NOTE — PHYSICAL THERAPY INITIAL EVALUATION ADULT - ADDITIONAL COMMENTS
Patient lives in a house with family with 1-2 steps to enter. Prior to admission patient did not use an assistive device for ambulation. Patient receives assistance with ADLs from family.

## 2024-04-06 ENCOUNTER — RESULT REVIEW (OUTPATIENT)
Age: 54
End: 2024-04-06

## 2024-04-06 LAB
ANION GAP SERPL CALC-SCNC: 14 MMOL/L — SIGNIFICANT CHANGE UP (ref 7–14)
BUN SERPL-MCNC: 13 MG/DL — SIGNIFICANT CHANGE UP (ref 7–23)
CALCIUM SERPL-MCNC: 9.8 MG/DL — SIGNIFICANT CHANGE UP (ref 8.4–10.5)
CHLORIDE SERPL-SCNC: 100 MMOL/L — SIGNIFICANT CHANGE UP (ref 98–107)
CO2 SERPL-SCNC: 23 MMOL/L — SIGNIFICANT CHANGE UP (ref 22–31)
CREAT SERPL-MCNC: 1.03 MG/DL — SIGNIFICANT CHANGE UP (ref 0.5–1.3)
EGFR: 87 ML/MIN/1.73M2 — SIGNIFICANT CHANGE UP
GLUCOSE SERPL-MCNC: 109 MG/DL — HIGH (ref 70–99)
HCT VFR BLD CALC: 31.2 % — LOW (ref 39–50)
HGB BLD-MCNC: 10.4 G/DL — LOW (ref 13–17)
MAGNESIUM SERPL-MCNC: 2.2 MG/DL — SIGNIFICANT CHANGE UP (ref 1.6–2.6)
MCHC RBC-ENTMCNC: 28.1 PG — SIGNIFICANT CHANGE UP (ref 27–34)
MCHC RBC-ENTMCNC: 33.3 GM/DL — SIGNIFICANT CHANGE UP (ref 32–36)
MCV RBC AUTO: 84.3 FL — SIGNIFICANT CHANGE UP (ref 80–100)
NRBC # BLD: 0 /100 WBCS — SIGNIFICANT CHANGE UP (ref 0–0)
NRBC # FLD: 0 K/UL — SIGNIFICANT CHANGE UP (ref 0–0)
PHOSPHATE SERPL-MCNC: 5.1 MG/DL — HIGH (ref 2.5–4.5)
PLATELET # BLD AUTO: 523 K/UL — HIGH (ref 150–400)
POTASSIUM SERPL-MCNC: 4.3 MMOL/L — SIGNIFICANT CHANGE UP (ref 3.5–5.3)
POTASSIUM SERPL-SCNC: 4.3 MMOL/L — SIGNIFICANT CHANGE UP (ref 3.5–5.3)
RBC # BLD: 3.7 M/UL — LOW (ref 4.2–5.8)
RBC # FLD: 13.4 % — SIGNIFICANT CHANGE UP (ref 10.3–14.5)
SODIUM SERPL-SCNC: 137 MMOL/L — SIGNIFICANT CHANGE UP (ref 135–145)
VANCOMYCIN TROUGH SERPL-MCNC: 5.5 UG/ML — LOW (ref 10–20)
WBC # BLD: 8.11 K/UL — SIGNIFICANT CHANGE UP (ref 3.8–10.5)
WBC # FLD AUTO: 8.11 K/UL — SIGNIFICANT CHANGE UP (ref 3.8–10.5)

## 2024-04-06 PROCEDURE — 93971 EXTREMITY STUDY: CPT | Mod: 26,LT

## 2024-04-06 RX ORDER — LACTOBACILLUS ACIDOPHILUS 100MM CELL
1 CAPSULE ORAL
Refills: 0 | Status: DISCONTINUED | OUTPATIENT
Start: 2024-04-06 | End: 2024-04-15

## 2024-04-06 RX ORDER — LACTULOSE 10 G/15ML
20 SOLUTION ORAL ONCE
Refills: 0 | Status: COMPLETED | OUTPATIENT
Start: 2024-04-06 | End: 2024-04-06

## 2024-04-06 RX ORDER — LIDOCAINE HCL 20 MG/ML
1 VIAL (ML) INJECTION ONCE
Refills: 0 | Status: DISCONTINUED | OUTPATIENT
Start: 2024-04-06 | End: 2024-04-06

## 2024-04-06 RX ORDER — LIDOCAINE HCL 20 MG/ML
1 VIAL (ML) INJECTION ONCE
Refills: 0 | Status: DISCONTINUED | OUTPATIENT
Start: 2024-04-06 | End: 2024-04-15

## 2024-04-06 RX ORDER — SODIUM CHLORIDE 0.65 %
1 AEROSOL, SPRAY (ML) NASAL
Refills: 0 | Status: DISCONTINUED | OUTPATIENT
Start: 2024-04-06 | End: 2024-04-15

## 2024-04-06 RX ORDER — SENNA PLUS 8.6 MG/1
2 TABLET ORAL AT BEDTIME
Refills: 0 | Status: DISCONTINUED | OUTPATIENT
Start: 2024-04-06 | End: 2024-04-15

## 2024-04-06 RX ADMIN — GABAPENTIN 300 MILLIGRAM(S): 400 CAPSULE ORAL at 13:12

## 2024-04-06 RX ADMIN — Medication 1 SPRAY(S): at 13:56

## 2024-04-06 RX ADMIN — Medication 1 APPLICATION(S): at 06:55

## 2024-04-06 RX ADMIN — APIXABAN 5 MILLIGRAM(S): 2.5 TABLET, FILM COATED ORAL at 06:54

## 2024-04-06 RX ADMIN — APIXABAN 5 MILLIGRAM(S): 2.5 TABLET, FILM COATED ORAL at 18:04

## 2024-04-06 RX ADMIN — LACTULOSE 20 GRAM(S): 10 SOLUTION ORAL at 06:54

## 2024-04-06 RX ADMIN — Medication 650 MILLIGRAM(S): at 21:14

## 2024-04-06 RX ADMIN — Medication 1 APPLICATION(S): at 21:06

## 2024-04-06 RX ADMIN — OXYCODONE HYDROCHLORIDE 5 MILLIGRAM(S): 5 TABLET ORAL at 20:48

## 2024-04-06 RX ADMIN — Medication 1 APPLICATION(S): at 09:36

## 2024-04-06 RX ADMIN — Medication 25 MILLIGRAM(S): at 06:54

## 2024-04-06 RX ADMIN — OXYCODONE HYDROCHLORIDE 5 MILLIGRAM(S): 5 TABLET ORAL at 01:04

## 2024-04-06 RX ADMIN — GABAPENTIN 300 MILLIGRAM(S): 400 CAPSULE ORAL at 06:54

## 2024-04-06 RX ADMIN — Medication 650 MILLIGRAM(S): at 22:14

## 2024-04-06 RX ADMIN — OXYCODONE HYDROCHLORIDE 5 MILLIGRAM(S): 5 TABLET ORAL at 00:04

## 2024-04-06 RX ADMIN — OXYCODONE HYDROCHLORIDE 5 MILLIGRAM(S): 5 TABLET ORAL at 19:48

## 2024-04-06 RX ADMIN — Medication 166.67 MILLIGRAM(S): at 09:49

## 2024-04-06 RX ADMIN — OXYCODONE HYDROCHLORIDE 5 MILLIGRAM(S): 5 TABLET ORAL at 08:09

## 2024-04-06 RX ADMIN — Medication 25 MILLIGRAM(S): at 13:12

## 2024-04-06 RX ADMIN — GABAPENTIN 300 MILLIGRAM(S): 400 CAPSULE ORAL at 21:05

## 2024-04-06 RX ADMIN — OXYCODONE HYDROCHLORIDE 5 MILLIGRAM(S): 5 TABLET ORAL at 09:00

## 2024-04-06 RX ADMIN — Medication 25 MILLIGRAM(S): at 21:05

## 2024-04-06 RX ADMIN — Medication 166.67 MILLIGRAM(S): at 21:06

## 2024-04-06 NOTE — DIETITIAN INITIAL EVALUATION ADULT - ADD RECOMMEND
Recommend add Ensure Max 1x daily (150cal, 30gm pro).   Recommend consider add Liquid Protein Supplement 2pks(200cal, 30gm pro per each PK), MVI and ascorbic acid to promote wound healing.  Monitor PO intake, Labs, weights, BMs, and skin integrity.   RD to remain available for further nutritional interventions as indicated.  Recommend add Ensure Max 1x daily (150cal, 30gm pro).   Recommend consider addition of Liquid Protein Supplement 2pks(200cal, 30gm pro per each PK), MVI and ascorbic acid to promote wound healing.  Monitor PO intake, Labs, weights, BMs, and skin integrity.   RD to remain available for further nutritional interventions as indicated.
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
## 2024-04-06 NOTE — DIETITIAN INITIAL EVALUATION ADULT - PERTINENT MEDS FT
MEDICATIONS  (STANDING):  apixaban 5 milliGRAM(s) Oral every 12 hours  bacitracin   Ointment 1 Application(s) Topical daily  bacitracin   Ointment 1 Application(s) Topical two times a day  chlorhexidine 2% Cloths 1 Application(s) Topical <User Schedule>  gabapentin 300 milliGRAM(s) Oral every 8 hours  lidocaine 1% Injectable 1 Vial(s) Local Injection once  metoprolol tartrate 25 milliGRAM(s) Oral every 8 hours  senna 2 Tablet(s) Oral at bedtime  sodium chloride 0.9%. 1000 milliLiter(s) (100 mL/Hr) IV Continuous <Continuous>  vancomycin  IVPB 1250 milliGRAM(s) IV Intermittent every 12 hours    MEDICATIONS  (PRN):  acetaminophen     Tablet .. 650 milliGRAM(s) Oral every 6 hours PRN Temp greater or equal to 38C (100.4F), Mild Pain (1 - 3)  oxyCODONE    IR 5 milliGRAM(s) Oral every 8 hours PRN Severe Pain (7 - 10)  sodium chloride 0.65% Nasal 1 Spray(s) Both Nostrils two times a day PRN Nasal Congestion
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
## 2024-04-06 NOTE — DIETITIAN INITIAL EVALUATION ADULT - PROBLEM SELECTOR PLAN 1
-c/w clindamycin  -wound care  -pain control  -would call plastics in am for wound management as well as stitches  removal from forehead; had planned stiches removal tomorrow at [facility]

## 2024-04-06 NOTE — DIETITIAN INITIAL EVALUATION ADULT - ORAL INTAKE PTA/DIET HISTORY
Pt reports his appetite has been good at baseline. Pt lives with his family, wife is one who help with food prep and cooking. Pt states he consumes 4 meals daily. Pt denies any recent weight loss.

## 2024-04-06 NOTE — DIETITIAN INITIAL EVALUATION ADULT - NSPROEDALEARNPREF_GEN_A_NUR
Discussed importance of consuming adequate fluids daily. Encouraged pt to increase fresh fruits and vegetable daily. Reviewed his daily protein needs to promote wound healing./verbal instruction

## 2024-04-06 NOTE — DIETITIAN INITIAL EVALUATION ADULT - NSICDXPASTMEDICALHX_GEN_ALL_CORE_FT
[FreeTextEntry1] : RTO in 1 year or sooner PRN PAST MEDICAL HISTORY:  Dermatomyositis     Pulmonary embolism

## 2024-04-06 NOTE — DIETITIAN INITIAL EVALUATION ADULT - PERTINENT LABORATORY DATA
04-06    137  |  100  |  13  ----------------------------<  109<H>  4.3   |  23  |  1.03    Ca    9.8      06 Apr 2024 06:54  Phos  5.1     04-06  Mg     2.20     04-06    TPro  3.4<L>  /  Alb  1.7<L>  /  TBili  0.4  /  DBili  x   /  AST  6   /  ALT  9   /  AlkPhos  121<H>  04-05
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
## 2024-04-07 LAB
ANION GAP SERPL CALC-SCNC: 12 MMOL/L — SIGNIFICANT CHANGE UP (ref 7–14)
APTT BLD: 29.2 SEC — SIGNIFICANT CHANGE UP (ref 24.5–35.6)
APTT BLD: 59.3 SEC — HIGH (ref 24.5–35.6)
BUN SERPL-MCNC: 14 MG/DL — SIGNIFICANT CHANGE UP (ref 7–23)
CALCIUM SERPL-MCNC: 9.4 MG/DL — SIGNIFICANT CHANGE UP (ref 8.4–10.5)
CHLORIDE SERPL-SCNC: 101 MMOL/L — SIGNIFICANT CHANGE UP (ref 98–107)
CO2 SERPL-SCNC: 24 MMOL/L — SIGNIFICANT CHANGE UP (ref 22–31)
CREAT SERPL-MCNC: 1.03 MG/DL — SIGNIFICANT CHANGE UP (ref 0.5–1.3)
EGFR: 87 ML/MIN/1.73M2 — SIGNIFICANT CHANGE UP
GLUCOSE SERPL-MCNC: 105 MG/DL — HIGH (ref 70–99)
HCT VFR BLD CALC: 31.3 % — LOW (ref 39–50)
HGB BLD-MCNC: 10.5 G/DL — LOW (ref 13–17)
INR BLD: 1.22 RATIO — HIGH (ref 0.85–1.18)
MAGNESIUM SERPL-MCNC: 2.3 MG/DL — SIGNIFICANT CHANGE UP (ref 1.6–2.6)
MCHC RBC-ENTMCNC: 28.2 PG — SIGNIFICANT CHANGE UP (ref 27–34)
MCHC RBC-ENTMCNC: 33.5 GM/DL — SIGNIFICANT CHANGE UP (ref 32–36)
MCV RBC AUTO: 84.1 FL — SIGNIFICANT CHANGE UP (ref 80–100)
NRBC # BLD: 0 /100 WBCS — SIGNIFICANT CHANGE UP (ref 0–0)
NRBC # FLD: 0 K/UL — SIGNIFICANT CHANGE UP (ref 0–0)
PHOSPHATE SERPL-MCNC: 4.7 MG/DL — HIGH (ref 2.5–4.5)
PLATELET # BLD AUTO: 468 K/UL — HIGH (ref 150–400)
POTASSIUM SERPL-MCNC: 4.3 MMOL/L — SIGNIFICANT CHANGE UP (ref 3.5–5.3)
POTASSIUM SERPL-SCNC: 4.3 MMOL/L — SIGNIFICANT CHANGE UP (ref 3.5–5.3)
PROTHROM AB SERPL-ACNC: 13.6 SEC — HIGH (ref 9.5–13)
RBC # BLD: 3.72 M/UL — LOW (ref 4.2–5.8)
RBC # FLD: 13.3 % — SIGNIFICANT CHANGE UP (ref 10.3–14.5)
SODIUM SERPL-SCNC: 137 MMOL/L — SIGNIFICANT CHANGE UP (ref 135–145)
VANCOMYCIN TROUGH SERPL-MCNC: 7.5 UG/ML — LOW (ref 10–20)
WBC # BLD: 6.81 K/UL — SIGNIFICANT CHANGE UP (ref 3.8–10.5)
WBC # FLD AUTO: 6.81 K/UL — SIGNIFICANT CHANGE UP (ref 3.8–10.5)

## 2024-04-07 RX ORDER — VANCOMYCIN HCL 1 G
1000 VIAL (EA) INTRAVENOUS EVERY 8 HOURS
Refills: 0 | Status: DISCONTINUED | OUTPATIENT
Start: 2024-04-08 | End: 2024-04-12

## 2024-04-07 RX ORDER — HEPARIN SODIUM 5000 [USP'U]/ML
INJECTION INTRAVENOUS; SUBCUTANEOUS
Qty: 25000 | Refills: 0 | Status: DISCONTINUED | OUTPATIENT
Start: 2024-04-07 | End: 2024-04-11

## 2024-04-07 RX ORDER — HEPARIN SODIUM 5000 [USP'U]/ML
4500 INJECTION INTRAVENOUS; SUBCUTANEOUS EVERY 6 HOURS
Refills: 0 | Status: DISCONTINUED | OUTPATIENT
Start: 2024-04-07 | End: 2024-04-11

## 2024-04-07 RX ORDER — HEPARIN SODIUM 5000 [USP'U]/ML
9000 INJECTION INTRAVENOUS; SUBCUTANEOUS EVERY 6 HOURS
Refills: 0 | Status: DISCONTINUED | OUTPATIENT
Start: 2024-04-07 | End: 2024-04-11

## 2024-04-07 RX ADMIN — Medication 1 TABLET(S): at 18:23

## 2024-04-07 RX ADMIN — Medication 166.67 MILLIGRAM(S): at 09:08

## 2024-04-07 RX ADMIN — APIXABAN 5 MILLIGRAM(S): 2.5 TABLET, FILM COATED ORAL at 05:49

## 2024-04-07 RX ADMIN — Medication 1 TABLET(S): at 12:22

## 2024-04-07 RX ADMIN — Medication 166.67 MILLIGRAM(S): at 23:27

## 2024-04-07 RX ADMIN — HEPARIN SODIUM 2000 UNIT(S)/HR: 5000 INJECTION INTRAVENOUS; SUBCUTANEOUS at 19:28

## 2024-04-07 RX ADMIN — GABAPENTIN 300 MILLIGRAM(S): 400 CAPSULE ORAL at 21:52

## 2024-04-07 RX ADMIN — Medication 1 TABLET(S): at 09:08

## 2024-04-07 RX ADMIN — HEPARIN SODIUM 2000 UNIT(S)/HR: 5000 INJECTION INTRAVENOUS; SUBCUTANEOUS at 16:13

## 2024-04-07 RX ADMIN — Medication 25 MILLIGRAM(S): at 05:49

## 2024-04-07 RX ADMIN — GABAPENTIN 300 MILLIGRAM(S): 400 CAPSULE ORAL at 05:49

## 2024-04-07 RX ADMIN — Medication 1 APPLICATION(S): at 12:17

## 2024-04-07 RX ADMIN — GABAPENTIN 300 MILLIGRAM(S): 400 CAPSULE ORAL at 14:23

## 2024-04-07 RX ADMIN — Medication 25 MILLIGRAM(S): at 21:52

## 2024-04-07 RX ADMIN — Medication 1 APPLICATION(S): at 18:23

## 2024-04-07 RX ADMIN — Medication 25 MILLIGRAM(S): at 14:23

## 2024-04-07 RX ADMIN — Medication 1 APPLICATION(S): at 05:49

## 2024-04-07 RX ADMIN — SENNA PLUS 2 TABLET(S): 8.6 TABLET ORAL at 21:53

## 2024-04-07 NOTE — PROGRESS NOTE ADULT - ASSESSMENT
53y Male w/ infected L hand wound after infiltrated IV and OSH 2 weeks ago. Also swith R scapula fx and L shoulder d/x s/p reduction at OSH being managed by general orthopedic team.    PLAN  -NWB left upper extremity   -IV Abx (Clinda-> Vanco) per ID   -Pain control  -US could not be performed   -Daily wound care with xeroform, bacitracin, guaze and kimani to be performed by RN. Please reach out with any questions.  - Please document medical clearance ASAP   - Plan for excision with grafting vs Integra this week once medically stable and transitioned off Eliquis

## 2024-04-08 ENCOUNTER — RESULT REVIEW (OUTPATIENT)
Age: 54
End: 2024-04-08

## 2024-04-08 LAB
ANION GAP SERPL CALC-SCNC: 15 MMOL/L — HIGH (ref 7–14)
APTT BLD: 84.1 SEC — HIGH (ref 24.5–35.6)
BUN SERPL-MCNC: 14 MG/DL — SIGNIFICANT CHANGE UP (ref 7–23)
CALCIUM SERPL-MCNC: 10 MG/DL — SIGNIFICANT CHANGE UP (ref 8.4–10.5)
CHLORIDE SERPL-SCNC: 100 MMOL/L — SIGNIFICANT CHANGE UP (ref 98–107)
CO2 SERPL-SCNC: 22 MMOL/L — SIGNIFICANT CHANGE UP (ref 22–31)
CREAT SERPL-MCNC: 0.97 MG/DL — SIGNIFICANT CHANGE UP (ref 0.5–1.3)
EGFR: 93 ML/MIN/1.73M2 — SIGNIFICANT CHANGE UP
GLUCOSE SERPL-MCNC: 113 MG/DL — HIGH (ref 70–99)
HCT VFR BLD CALC: 33.1 % — LOW (ref 39–50)
HCT VFR BLD CALC: 33.2 % — LOW (ref 39–50)
HGB BLD-MCNC: 10.5 G/DL — LOW (ref 13–17)
HGB BLD-MCNC: 10.5 G/DL — LOW (ref 13–17)
MAGNESIUM SERPL-MCNC: 2.1 MG/DL — SIGNIFICANT CHANGE UP (ref 1.6–2.6)
MCHC RBC-ENTMCNC: 27 PG — SIGNIFICANT CHANGE UP (ref 27–34)
MCHC RBC-ENTMCNC: 27.1 PG — SIGNIFICANT CHANGE UP (ref 27–34)
MCHC RBC-ENTMCNC: 31.6 GM/DL — LOW (ref 32–36)
MCHC RBC-ENTMCNC: 31.7 GM/DL — LOW (ref 32–36)
MCV RBC AUTO: 85.1 FL — SIGNIFICANT CHANGE UP (ref 80–100)
MCV RBC AUTO: 85.8 FL — SIGNIFICANT CHANGE UP (ref 80–100)
NRBC # BLD: 0 /100 WBCS — SIGNIFICANT CHANGE UP (ref 0–0)
NRBC # BLD: 0 /100 WBCS — SIGNIFICANT CHANGE UP (ref 0–0)
NRBC # FLD: 0 K/UL — SIGNIFICANT CHANGE UP (ref 0–0)
NRBC # FLD: 0 K/UL — SIGNIFICANT CHANGE UP (ref 0–0)
PHOSPHATE SERPL-MCNC: 5 MG/DL — HIGH (ref 2.5–4.5)
PLATELET # BLD AUTO: 462 K/UL — HIGH (ref 150–400)
PLATELET # BLD AUTO: 470 K/UL — HIGH (ref 150–400)
POTASSIUM SERPL-MCNC: 4.3 MMOL/L — SIGNIFICANT CHANGE UP (ref 3.5–5.3)
POTASSIUM SERPL-SCNC: 4.3 MMOL/L — SIGNIFICANT CHANGE UP (ref 3.5–5.3)
RBC # BLD: 3.87 M/UL — LOW (ref 4.2–5.8)
RBC # BLD: 3.89 M/UL — LOW (ref 4.2–5.8)
RBC # FLD: 13.3 % — SIGNIFICANT CHANGE UP (ref 10.3–14.5)
RBC # FLD: 13.5 % — SIGNIFICANT CHANGE UP (ref 10.3–14.5)
SODIUM SERPL-SCNC: 137 MMOL/L — SIGNIFICANT CHANGE UP (ref 135–145)
WBC # BLD: 7.6 K/UL — SIGNIFICANT CHANGE UP (ref 3.8–10.5)
WBC # BLD: 7.97 K/UL — SIGNIFICANT CHANGE UP (ref 3.8–10.5)
WBC # FLD AUTO: 7.6 K/UL — SIGNIFICANT CHANGE UP (ref 3.8–10.5)
WBC # FLD AUTO: 7.97 K/UL — SIGNIFICANT CHANGE UP (ref 3.8–10.5)

## 2024-04-08 PROCEDURE — 93306 TTE W/DOPPLER COMPLETE: CPT | Mod: 26

## 2024-04-08 RX ADMIN — GABAPENTIN 300 MILLIGRAM(S): 400 CAPSULE ORAL at 13:41

## 2024-04-08 RX ADMIN — HEPARIN SODIUM 2000 UNIT(S)/HR: 5000 INJECTION INTRAVENOUS; SUBCUTANEOUS at 04:56

## 2024-04-08 RX ADMIN — GABAPENTIN 300 MILLIGRAM(S): 400 CAPSULE ORAL at 05:23

## 2024-04-08 RX ADMIN — Medication 1 APPLICATION(S): at 05:23

## 2024-04-08 RX ADMIN — Medication 25 MILLIGRAM(S): at 13:41

## 2024-04-08 RX ADMIN — Medication 1 TABLET(S): at 12:06

## 2024-04-08 RX ADMIN — Medication 250 MILLIGRAM(S): at 23:46

## 2024-04-08 RX ADMIN — Medication 250 MILLIGRAM(S): at 06:39

## 2024-04-08 RX ADMIN — Medication 1 APPLICATION(S): at 21:23

## 2024-04-08 RX ADMIN — Medication 1 TABLET(S): at 08:40

## 2024-04-08 RX ADMIN — HEPARIN SODIUM 2000 UNIT(S)/HR: 5000 INJECTION INTRAVENOUS; SUBCUTANEOUS at 07:31

## 2024-04-08 RX ADMIN — HEPARIN SODIUM 2000 UNIT(S)/HR: 5000 INJECTION INTRAVENOUS; SUBCUTANEOUS at 19:17

## 2024-04-08 RX ADMIN — Medication 25 MILLIGRAM(S): at 05:23

## 2024-04-08 RX ADMIN — Medication 25 MILLIGRAM(S): at 21:19

## 2024-04-08 RX ADMIN — GABAPENTIN 300 MILLIGRAM(S): 400 CAPSULE ORAL at 21:19

## 2024-04-08 RX ADMIN — SENNA PLUS 2 TABLET(S): 8.6 TABLET ORAL at 21:19

## 2024-04-08 RX ADMIN — Medication 250 MILLIGRAM(S): at 15:25

## 2024-04-09 LAB
ANION GAP SERPL CALC-SCNC: 15 MMOL/L — HIGH (ref 7–14)
APTT BLD: 62.2 SEC — HIGH (ref 24.5–35.6)
BASOPHILS # BLD AUTO: 0.04 K/UL — SIGNIFICANT CHANGE UP (ref 0–0.2)
BASOPHILS NFR BLD AUTO: 0.5 % — SIGNIFICANT CHANGE UP (ref 0–2)
BUN SERPL-MCNC: 12 MG/DL — SIGNIFICANT CHANGE UP (ref 7–23)
CALCIUM SERPL-MCNC: 9.7 MG/DL — SIGNIFICANT CHANGE UP (ref 8.4–10.5)
CHLORIDE SERPL-SCNC: 101 MMOL/L — SIGNIFICANT CHANGE UP (ref 98–107)
CO2 SERPL-SCNC: 22 MMOL/L — SIGNIFICANT CHANGE UP (ref 22–31)
CREAT SERPL-MCNC: 1.06 MG/DL — SIGNIFICANT CHANGE UP (ref 0.5–1.3)
CULTURE RESULTS: SIGNIFICANT CHANGE UP
CULTURE RESULTS: SIGNIFICANT CHANGE UP
EGFR: 84 ML/MIN/1.73M2 — SIGNIFICANT CHANGE UP
EOSINOPHIL # BLD AUTO: 0.3 K/UL — SIGNIFICANT CHANGE UP (ref 0–0.5)
EOSINOPHIL NFR BLD AUTO: 4.1 % — SIGNIFICANT CHANGE UP (ref 0–6)
GLUCOSE SERPL-MCNC: 108 MG/DL — HIGH (ref 70–99)
HCT VFR BLD CALC: 34.8 % — LOW (ref 39–50)
HGB BLD-MCNC: 10.9 G/DL — LOW (ref 13–17)
IANC: 3.69 K/UL — SIGNIFICANT CHANGE UP (ref 1.8–7.4)
IMM GRANULOCYTES NFR BLD AUTO: 0.3 % — SIGNIFICANT CHANGE UP (ref 0–0.9)
LYMPHOCYTES # BLD AUTO: 2.44 K/UL — SIGNIFICANT CHANGE UP (ref 1–3.3)
LYMPHOCYTES # BLD AUTO: 33.3 % — SIGNIFICANT CHANGE UP (ref 13–44)
MAGNESIUM SERPL-MCNC: 2.1 MG/DL — SIGNIFICANT CHANGE UP (ref 1.6–2.6)
MCHC RBC-ENTMCNC: 26.7 PG — LOW (ref 27–34)
MCHC RBC-ENTMCNC: 31.3 GM/DL — LOW (ref 32–36)
MCV RBC AUTO: 85.3 FL — SIGNIFICANT CHANGE UP (ref 80–100)
MONOCYTES # BLD AUTO: 0.84 K/UL — SIGNIFICANT CHANGE UP (ref 0–0.9)
MONOCYTES NFR BLD AUTO: 11.5 % — SIGNIFICANT CHANGE UP (ref 2–14)
NEUTROPHILS # BLD AUTO: 3.69 K/UL — SIGNIFICANT CHANGE UP (ref 1.8–7.4)
NEUTROPHILS NFR BLD AUTO: 50.3 % — SIGNIFICANT CHANGE UP (ref 43–77)
NRBC # BLD: 0 /100 WBCS — SIGNIFICANT CHANGE UP (ref 0–0)
NRBC # FLD: 0 K/UL — SIGNIFICANT CHANGE UP (ref 0–0)
PHOSPHATE SERPL-MCNC: 4.9 MG/DL — HIGH (ref 2.5–4.5)
PLATELET # BLD AUTO: 431 K/UL — HIGH (ref 150–400)
POTASSIUM SERPL-MCNC: 4.2 MMOL/L — SIGNIFICANT CHANGE UP (ref 3.5–5.3)
POTASSIUM SERPL-SCNC: 4.2 MMOL/L — SIGNIFICANT CHANGE UP (ref 3.5–5.3)
RBC # BLD: 4.08 M/UL — LOW (ref 4.2–5.8)
RBC # FLD: 13.4 % — SIGNIFICANT CHANGE UP (ref 10.3–14.5)
SODIUM SERPL-SCNC: 138 MMOL/L — SIGNIFICANT CHANGE UP (ref 135–145)
SPECIMEN SOURCE: SIGNIFICANT CHANGE UP
SPECIMEN SOURCE: SIGNIFICANT CHANGE UP
VANCOMYCIN TROUGH SERPL-MCNC: 15.2 UG/ML — SIGNIFICANT CHANGE UP (ref 10–20)
WBC # BLD: 7.33 K/UL — SIGNIFICANT CHANGE UP (ref 3.8–10.5)
WBC # FLD AUTO: 7.33 K/UL — SIGNIFICANT CHANGE UP (ref 3.8–10.5)

## 2024-04-09 PROCEDURE — 99232 SBSQ HOSP IP/OBS MODERATE 35: CPT

## 2024-04-09 RX ADMIN — Medication 1 TABLET(S): at 09:46

## 2024-04-09 RX ADMIN — Medication 25 MILLIGRAM(S): at 22:14

## 2024-04-09 RX ADMIN — Medication 1 TABLET(S): at 12:57

## 2024-04-09 RX ADMIN — Medication 25 MILLIGRAM(S): at 05:28

## 2024-04-09 RX ADMIN — CHLORHEXIDINE GLUCONATE 1 APPLICATION(S): 213 SOLUTION TOPICAL at 12:58

## 2024-04-09 RX ADMIN — Medication 1 APPLICATION(S): at 12:57

## 2024-04-09 RX ADMIN — HEPARIN SODIUM 2000 UNIT(S)/HR: 5000 INJECTION INTRAVENOUS; SUBCUTANEOUS at 20:23

## 2024-04-09 RX ADMIN — Medication 1 APPLICATION(S): at 05:28

## 2024-04-09 RX ADMIN — Medication 650 MILLIGRAM(S): at 18:30

## 2024-04-09 RX ADMIN — Medication 1 TABLET(S): at 19:50

## 2024-04-09 RX ADMIN — HEPARIN SODIUM 2000 UNIT(S)/HR: 5000 INJECTION INTRAVENOUS; SUBCUTANEOUS at 16:37

## 2024-04-09 RX ADMIN — Medication 1 APPLICATION(S): at 19:50

## 2024-04-09 RX ADMIN — GABAPENTIN 300 MILLIGRAM(S): 400 CAPSULE ORAL at 13:37

## 2024-04-09 RX ADMIN — Medication 250 MILLIGRAM(S): at 15:41

## 2024-04-09 RX ADMIN — SENNA PLUS 2 TABLET(S): 8.6 TABLET ORAL at 22:15

## 2024-04-09 RX ADMIN — HEPARIN SODIUM 2000 UNIT(S)/HR: 5000 INJECTION INTRAVENOUS; SUBCUTANEOUS at 08:38

## 2024-04-09 RX ADMIN — Medication 25 MILLIGRAM(S): at 13:38

## 2024-04-09 RX ADMIN — GABAPENTIN 300 MILLIGRAM(S): 400 CAPSULE ORAL at 22:15

## 2024-04-09 RX ADMIN — GABAPENTIN 300 MILLIGRAM(S): 400 CAPSULE ORAL at 05:28

## 2024-04-09 RX ADMIN — Medication 650 MILLIGRAM(S): at 18:00

## 2024-04-09 RX ADMIN — Medication 250 MILLIGRAM(S): at 06:53

## 2024-04-09 RX ADMIN — HEPARIN SODIUM 2000 UNIT(S)/HR: 5000 INJECTION INTRAVENOUS; SUBCUTANEOUS at 08:08

## 2024-04-09 RX ADMIN — Medication 250 MILLIGRAM(S): at 23:40
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
## 2024-04-10 ENCOUNTER — TRANSCRIPTION ENCOUNTER (OUTPATIENT)
Age: 54
End: 2024-04-10

## 2024-04-10 LAB
ANION GAP SERPL CALC-SCNC: 18 MMOL/L — HIGH (ref 7–14)
APTT BLD: 177.2 SEC — CRITICAL HIGH (ref 24.5–35.6)
APTT BLD: 42.2 SEC — HIGH (ref 24.5–35.6)
APTT BLD: 75.3 SEC — HIGH (ref 24.5–35.6)
BUN SERPL-MCNC: 12 MG/DL — SIGNIFICANT CHANGE UP (ref 7–23)
CALCIUM SERPL-MCNC: 9.8 MG/DL — SIGNIFICANT CHANGE UP (ref 8.4–10.5)
CHLORIDE SERPL-SCNC: 99 MMOL/L — SIGNIFICANT CHANGE UP (ref 98–107)
CO2 SERPL-SCNC: 21 MMOL/L — LOW (ref 22–31)
CREAT SERPL-MCNC: 0.97 MG/DL — SIGNIFICANT CHANGE UP (ref 0.5–1.3)
EGFR: 93 ML/MIN/1.73M2 — SIGNIFICANT CHANGE UP
GLUCOSE SERPL-MCNC: 100 MG/DL — HIGH (ref 70–99)
HCT VFR BLD CALC: 33.2 % — LOW (ref 39–50)
HGB BLD-MCNC: 10.5 G/DL — LOW (ref 13–17)
MAGNESIUM SERPL-MCNC: 1.8 MG/DL — SIGNIFICANT CHANGE UP (ref 1.6–2.6)
MCHC RBC-ENTMCNC: 27.1 PG — SIGNIFICANT CHANGE UP (ref 27–34)
MCHC RBC-ENTMCNC: 31.6 GM/DL — LOW (ref 32–36)
MCV RBC AUTO: 85.6 FL — SIGNIFICANT CHANGE UP (ref 80–100)
NRBC # BLD: 0 /100 WBCS — SIGNIFICANT CHANGE UP (ref 0–0)
NRBC # FLD: 0 K/UL — SIGNIFICANT CHANGE UP (ref 0–0)
PHOSPHATE SERPL-MCNC: 5.1 MG/DL — HIGH (ref 2.5–4.5)
PLATELET # BLD AUTO: 408 K/UL — HIGH (ref 150–400)
POTASSIUM SERPL-MCNC: 4.6 MMOL/L — SIGNIFICANT CHANGE UP (ref 3.5–5.3)
POTASSIUM SERPL-SCNC: 4.6 MMOL/L — SIGNIFICANT CHANGE UP (ref 3.5–5.3)
RBC # BLD: 3.88 M/UL — LOW (ref 4.2–5.8)
RBC # FLD: 13.3 % — SIGNIFICANT CHANGE UP (ref 10.3–14.5)
SODIUM SERPL-SCNC: 138 MMOL/L — SIGNIFICANT CHANGE UP (ref 135–145)
VANCOMYCIN TROUGH SERPL-MCNC: 12 UG/ML — SIGNIFICANT CHANGE UP (ref 10–20)
WBC # BLD: 6.99 K/UL — SIGNIFICANT CHANGE UP (ref 3.8–10.5)
WBC # FLD AUTO: 6.99 K/UL — SIGNIFICANT CHANGE UP (ref 3.8–10.5)

## 2024-04-10 RX ADMIN — Medication 1 APPLICATION(S): at 05:51

## 2024-04-10 RX ADMIN — Medication 1 TABLET(S): at 12:25

## 2024-04-10 RX ADMIN — HEPARIN SODIUM 1800 UNIT(S)/HR: 5000 INJECTION INTRAVENOUS; SUBCUTANEOUS at 16:34

## 2024-04-10 RX ADMIN — Medication 1 TABLET(S): at 09:53

## 2024-04-10 RX ADMIN — Medication 25 MILLIGRAM(S): at 22:21

## 2024-04-10 RX ADMIN — HEPARIN SODIUM 2200 UNIT(S)/HR: 5000 INJECTION INTRAVENOUS; SUBCUTANEOUS at 08:29

## 2024-04-10 RX ADMIN — Medication 1 APPLICATION(S): at 12:25

## 2024-04-10 RX ADMIN — Medication 25 MILLIGRAM(S): at 05:50

## 2024-04-10 RX ADMIN — HEPARIN SODIUM 1800 UNIT(S)/HR: 5000 INJECTION INTRAVENOUS; SUBCUTANEOUS at 20:18

## 2024-04-10 RX ADMIN — HEPARIN SODIUM 0 UNIT(S)/HR: 5000 INJECTION INTRAVENOUS; SUBCUTANEOUS at 15:19

## 2024-04-10 RX ADMIN — GABAPENTIN 300 MILLIGRAM(S): 400 CAPSULE ORAL at 14:04

## 2024-04-10 RX ADMIN — Medication 650 MILLIGRAM(S): at 06:20

## 2024-04-10 RX ADMIN — Medication 25 MILLIGRAM(S): at 14:04

## 2024-04-10 RX ADMIN — HEPARIN SODIUM 4500 UNIT(S): 5000 INJECTION INTRAVENOUS; SUBCUTANEOUS at 08:41

## 2024-04-10 RX ADMIN — GABAPENTIN 300 MILLIGRAM(S): 400 CAPSULE ORAL at 22:20

## 2024-04-10 RX ADMIN — Medication 1 TABLET(S): at 18:14

## 2024-04-10 RX ADMIN — CHLORHEXIDINE GLUCONATE 1 APPLICATION(S): 213 SOLUTION TOPICAL at 12:25

## 2024-04-10 RX ADMIN — Medication 650 MILLIGRAM(S): at 05:50

## 2024-04-10 RX ADMIN — GABAPENTIN 300 MILLIGRAM(S): 400 CAPSULE ORAL at 05:50

## 2024-04-10 RX ADMIN — Medication 250 MILLIGRAM(S): at 17:51

## 2024-04-10 RX ADMIN — Medication 250 MILLIGRAM(S): at 07:12

## 2024-04-10 RX ADMIN — Medication 1 APPLICATION(S): at 18:15

## 2024-04-10 RX ADMIN — HEPARIN SODIUM 2000 UNIT(S)/HR: 5000 INJECTION INTRAVENOUS; SUBCUTANEOUS at 05:49

## 2024-04-10 NOTE — PROGRESS NOTE ADULT - ASSESSMENT
ASSESSMENT/PLAN:   TIMI GUERRERO is a 53yMale with L dorsal hand wound 2/2 IV infiltration.     - Wound care: xeroform, guaze, kerlix dressing changes per nursing   - Continue vancomycin per ID  - Diet  - Pain control  - Incentive spirometry  - Activity as tolerated; encourage ROM of fingers and wrist to reduce stiffness   - Continue DVT prophylaxis; heparin gtt   - Cleared for surgery by medicine 4/9  - Surgical planning with Dr. Valderrama     Plastic Surgery   [facility]: [id number]  [facility]: [phone] ASSESSMENT/PLAN:   TIMI GUERRERO is a 53yMale with L dorsal hand wound 2/2 IV infiltration.     - Plan for OR tomorrow with Dr. Valderrama  - 6am labs  - Hold heparin gtt 6 hours prior to surgery   - Cleared for surgery by medicine 4/9  - Wound care: xeroform, guaze, kerlix dressing changes per nursing   - Continue vancomycin per ID  - Activity as tolerated; encourage ROM of fingers and wrist to reduce stiffness       Plastic Surgery   [facility]: [id number]  [facility]: [phone] ASSESSMENT/PLAN:   TIMI GUERRERO is a 53yMale with L dorsal hand wound 2/2 IV infiltration.     - Plan for OR tomorrow with Dr. Valderrama  - NPO/IVF at midnight   - 4am labs CBC/BMP/coags/T&S  - Hold heparin gtt 6 hours prior to surgery   - Cleared for surgery by medicine 4/9  - Wound care: xeroform, guaze, kerlix dressing changes per nursing   - Continue vancomycin per ID  - Activity as tolerated; encourage ROM of fingers and wrist to reduce stiffness       Plastic Surgery   [facility]: [id number]  [facility]: [phone] ASSESSMENT/PLAN:   TIMI GUERRERO is a 53yMale with L dorsal hand wound 2/2 IV infiltration.     - Plan for OR tomorrow with Dr. Valderrama  - NPO/IVF at midnight   - 4am labs CBC/BMP/coags/T&S x2  - Hold heparin gtt 6 hours prior to surgery   - Cleared for surgery by medicine 4/9  - Wound care: xeroform, guaze, kerlix dressing changes per nursing   - Continue vancomycin per ID  - Activity as tolerated; encourage ROM of fingers and wrist to reduce stiffness       Plastic Surgery   [facility]: [id number]  [facility]: [phone]

## 2024-04-10 NOTE — PROGRESS NOTE ADULT - ATTENDING COMMENTS
Agree with resident note  Plan for excision of left dorsal hand full thickness necrosis with Integra vs skin graft this week once cleared and off Warren Valderrama MD  Plastic, Reconstructive, & Hand Surgery  [facility]  [phone]
Agree with resident note  Plan for excision with grafting vs Integra this week once medically stable and transitioned off Warren Valderrama MD  Plastic, Reconstructive, & Hand Surgery  [facility]  [phone]
Patient seen and examined at bedside this morning  Met with patient and his family at bedside  Long discussion about planned procedure, risks, benefits, alternatives, and expected outcomes  All questions answered  Scheduled for OR tomorrow at 12:30  Plan for excision of left dorsal hand full thickness necrosis, possible intergra vs skin graft  NPO past midnight  Please hold heparin gtt at 7AM  Please call with any questions or concerns    Wayne Valderrama MD  Plastic, Reconstructive, & Hand Surgery  [facility]  [phone]

## 2024-04-11 LAB
ANION GAP SERPL CALC-SCNC: 14 MMOL/L — SIGNIFICANT CHANGE UP (ref 7–14)
APTT BLD: 85.6 SEC — HIGH (ref 24.5–35.6)
BASOPHILS # BLD AUTO: 0.04 K/UL — SIGNIFICANT CHANGE UP (ref 0–0.2)
BASOPHILS NFR BLD AUTO: 0.5 % — SIGNIFICANT CHANGE UP (ref 0–2)
BLD GP AB SCN SERPL QL: NEGATIVE — SIGNIFICANT CHANGE UP
BUN SERPL-MCNC: 9 MG/DL — SIGNIFICANT CHANGE UP (ref 7–23)
CALCIUM SERPL-MCNC: 10.1 MG/DL — SIGNIFICANT CHANGE UP (ref 8.4–10.5)
CHLORIDE SERPL-SCNC: 101 MMOL/L — SIGNIFICANT CHANGE UP (ref 98–107)
CO2 SERPL-SCNC: 24 MMOL/L — SIGNIFICANT CHANGE UP (ref 22–31)
CREAT SERPL-MCNC: 0.96 MG/DL — SIGNIFICANT CHANGE UP (ref 0.5–1.3)
EGFR: 95 ML/MIN/1.73M2 — SIGNIFICANT CHANGE UP
EOSINOPHIL # BLD AUTO: 0.36 K/UL — SIGNIFICANT CHANGE UP (ref 0–0.5)
EOSINOPHIL NFR BLD AUTO: 4.8 % — SIGNIFICANT CHANGE UP (ref 0–6)
GLUCOSE SERPL-MCNC: 101 MG/DL — HIGH (ref 70–99)
HCT VFR BLD CALC: 34.9 % — LOW (ref 39–50)
HGB BLD-MCNC: 10.9 G/DL — LOW (ref 13–17)
IANC: 3.96 K/UL — SIGNIFICANT CHANGE UP (ref 1.8–7.4)
IMM GRANULOCYTES NFR BLD AUTO: 0.8 % — SIGNIFICANT CHANGE UP (ref 0–0.9)
INR BLD: 1.13 RATIO — SIGNIFICANT CHANGE UP (ref 0.85–1.18)
LYMPHOCYTES # BLD AUTO: 2.22 K/UL — SIGNIFICANT CHANGE UP (ref 1–3.3)
LYMPHOCYTES # BLD AUTO: 29.6 % — SIGNIFICANT CHANGE UP (ref 13–44)
MAGNESIUM SERPL-MCNC: 2 MG/DL — SIGNIFICANT CHANGE UP (ref 1.6–2.6)
MCHC RBC-ENTMCNC: 26.9 PG — LOW (ref 27–34)
MCHC RBC-ENTMCNC: 31.2 GM/DL — LOW (ref 32–36)
MCV RBC AUTO: 86.2 FL — SIGNIFICANT CHANGE UP (ref 80–100)
MONOCYTES # BLD AUTO: 0.85 K/UL — SIGNIFICANT CHANGE UP (ref 0–0.9)
MONOCYTES NFR BLD AUTO: 11.3 % — SIGNIFICANT CHANGE UP (ref 2–14)
NEUTROPHILS # BLD AUTO: 3.96 K/UL — SIGNIFICANT CHANGE UP (ref 1.8–7.4)
NEUTROPHILS NFR BLD AUTO: 53 % — SIGNIFICANT CHANGE UP (ref 43–77)
NRBC # BLD: 0 /100 WBCS — SIGNIFICANT CHANGE UP (ref 0–0)
NRBC # FLD: 0 K/UL — SIGNIFICANT CHANGE UP (ref 0–0)
PHOSPHATE SERPL-MCNC: 5.1 MG/DL — HIGH (ref 2.5–4.5)
PLATELET # BLD AUTO: 407 K/UL — HIGH (ref 150–400)
POTASSIUM SERPL-MCNC: 4 MMOL/L — SIGNIFICANT CHANGE UP (ref 3.5–5.3)
POTASSIUM SERPL-SCNC: 4 MMOL/L — SIGNIFICANT CHANGE UP (ref 3.5–5.3)
PROTHROM AB SERPL-ACNC: 12.6 SEC — SIGNIFICANT CHANGE UP (ref 9.5–13)
RBC # BLD: 4.05 M/UL — LOW (ref 4.2–5.8)
RBC # FLD: 13.5 % — SIGNIFICANT CHANGE UP (ref 10.3–14.5)
RH IG SCN BLD-IMP: POSITIVE — SIGNIFICANT CHANGE UP
SODIUM SERPL-SCNC: 139 MMOL/L — SIGNIFICANT CHANGE UP (ref 135–145)
WBC # BLD: 7.49 K/UL — SIGNIFICANT CHANGE UP (ref 3.8–10.5)
WBC # FLD AUTO: 7.49 K/UL — SIGNIFICANT CHANGE UP (ref 3.8–10.5)

## 2024-04-11 DEVICE — VISTASEAL FIBRIN HUMAN 10ML: Type: IMPLANTABLE DEVICE | Status: FUNCTIONAL

## 2024-04-11 RX ORDER — HYDROMORPHONE HYDROCHLORIDE 2 MG/ML
0.5 INJECTION INTRAMUSCULAR; INTRAVENOUS; SUBCUTANEOUS
Refills: 0 | Status: DISCONTINUED | OUTPATIENT
Start: 2024-04-11 | End: 2024-04-11

## 2024-04-11 RX ORDER — ONDANSETRON 8 MG/1
4 TABLET, FILM COATED ORAL ONCE
Refills: 0 | Status: DISCONTINUED | OUTPATIENT
Start: 2024-04-11 | End: 2024-04-11

## 2024-04-11 RX ADMIN — CHLORHEXIDINE GLUCONATE 1 APPLICATION(S): 213 SOLUTION TOPICAL at 05:29

## 2024-04-11 RX ADMIN — Medication 25 MILLIGRAM(S): at 05:31

## 2024-04-11 RX ADMIN — GABAPENTIN 300 MILLIGRAM(S): 400 CAPSULE ORAL at 22:22

## 2024-04-11 RX ADMIN — HEPARIN SODIUM 1800 UNIT(S)/HR: 5000 INJECTION INTRAVENOUS; SUBCUTANEOUS at 07:03

## 2024-04-11 RX ADMIN — Medication 1 TABLET(S): at 09:02

## 2024-04-11 RX ADMIN — Medication 25 MILLIGRAM(S): at 22:21

## 2024-04-11 RX ADMIN — GABAPENTIN 300 MILLIGRAM(S): 400 CAPSULE ORAL at 05:30

## 2024-04-11 RX ADMIN — Medication 250 MILLIGRAM(S): at 03:09

## 2024-04-11 RX ADMIN — OXYCODONE HYDROCHLORIDE 5 MILLIGRAM(S): 5 TABLET ORAL at 23:48

## 2024-04-11 RX ADMIN — Medication 250 MILLIGRAM(S): at 15:30

## 2024-04-11 RX ADMIN — OXYCODONE HYDROCHLORIDE 5 MILLIGRAM(S): 5 TABLET ORAL at 22:48

## 2024-04-11 RX ADMIN — Medication 250 MILLIGRAM(S): at 09:02
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
## 2024-04-11 NOTE — BRIEF OPERATIVE NOTE - OPERATION/FINDINGS
Full thickness necrosis left dorsal hand  No purulent drainage  Extensor tendons and retinaculum intact

## 2024-04-11 NOTE — PRE-OP CHECKLIST - BSA (M2)
This medication refill is regarding a MyChart request. Refill requested by patient.    Requested Prescriptions     Pending Prescriptions Disp Refills    atorvastatin (LIPITOR) 40 MG tablet 90 tablet 3     Sig: Take 1 tablet by mouth daily       Date of last visit: 6/29/2022   Date of next visit: 1/18/2024  Date of last refill: 1/19/2023  90/3    Last Lipid Panel:    Lab Results   Component Value Date/Time    CHOL 157 01/06/2024 08:56 AM    CHOL 16 12/05/2022 12:00 AM    TRIG 75 01/06/2024 08:56 AM    HDL 57 01/06/2024 08:56 AM    HDL 60 09/04/2010 12:00 AM    LDLCALC 85 01/06/2024 08:56 AM     Last CMP:   Lab Results   Component Value Date     07/07/2023    K 4.8 07/07/2023    CL 99 07/07/2023    CO2 27 07/07/2023    BUN 19 07/07/2023    CREATININE 0.7 07/07/2023    GLUCOSE 157 (H) 07/07/2023    CALCIUM 8.8 07/07/2023    PROT 6.4 01/06/2024    LABALBU 4.1 01/06/2024    BILITOT 0.5 01/06/2024    ALKPHOS 206 (H) 01/06/2024    AST 20 01/06/2024    ALT 16 01/06/2024    LABGLOM >60 07/07/2023       Last Thyroid:    Lab Results   Component Value Date    TSH 2.67 01/06/2024    T4FREE 0.83 12/11/2021     Last Hemoglobin A1C:    Lab Results   Component Value Date/Time    LABA1C 7.2 01/06/2024 08:56 AM    AVGG 160 01/06/2024 08:56 AM       Rx verified, ordered and set to EP.       
2.34
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
## 2024-04-11 NOTE — BRIEF OPERATIVE NOTE - NSICDXBRIEFPROCEDURE_GEN_ALL_CORE_FT
PROCEDURES:  Application of split thickness skin graft to left upper extremity 11-Apr-2024 17:32:21  Wayne Valderrama

## 2024-04-11 NOTE — PRE-OP CHECKLIST - COMMENTS
lopressor and gabapentin sips of water this moring lopressor and gabapentin sips of water this morning

## 2024-04-12 ENCOUNTER — TRANSCRIPTION ENCOUNTER (OUTPATIENT)
Age: 54
End: 2024-04-12

## 2024-04-12 LAB
ADD ON TEST-SPECIMEN IN LAB: SIGNIFICANT CHANGE UP
ANION GAP SERPL CALC-SCNC: 13 MMOL/L — SIGNIFICANT CHANGE UP (ref 7–14)
BASOPHILS # BLD AUTO: 0.02 K/UL — SIGNIFICANT CHANGE UP (ref 0–0.2)
BASOPHILS NFR BLD AUTO: 0.3 % — SIGNIFICANT CHANGE UP (ref 0–2)
BUN SERPL-MCNC: 12 MG/DL — SIGNIFICANT CHANGE UP (ref 7–23)
CALCIUM SERPL-MCNC: 9.6 MG/DL — SIGNIFICANT CHANGE UP (ref 8.4–10.5)
CHLORIDE SERPL-SCNC: 99 MMOL/L — SIGNIFICANT CHANGE UP (ref 98–107)
CHOLEST SERPL-MCNC: 178 MG/DL — SIGNIFICANT CHANGE UP
CO2 SERPL-SCNC: 24 MMOL/L — SIGNIFICANT CHANGE UP (ref 22–31)
CREAT SERPL-MCNC: 1.01 MG/DL — SIGNIFICANT CHANGE UP (ref 0.5–1.3)
EGFR: 89 ML/MIN/1.73M2 — SIGNIFICANT CHANGE UP
EOSINOPHIL # BLD AUTO: 0.21 K/UL — SIGNIFICANT CHANGE UP (ref 0–0.5)
EOSINOPHIL NFR BLD AUTO: 2.8 % — SIGNIFICANT CHANGE UP (ref 0–6)
FERRITIN SERPL-MCNC: 705 NG/ML — HIGH (ref 30–400)
GLUCOSE SERPL-MCNC: 109 MG/DL — HIGH (ref 70–99)
GRAM STN FLD: ABNORMAL
HCT VFR BLD CALC: 33.4 % — LOW (ref 39–50)
HDLC SERPL-MCNC: 27 MG/DL — LOW
HGB BLD-MCNC: 10.6 G/DL — LOW (ref 13–17)
IANC: 5.14 K/UL — SIGNIFICANT CHANGE UP (ref 1.8–7.4)
IMM GRANULOCYTES NFR BLD AUTO: 0.3 % — SIGNIFICANT CHANGE UP (ref 0–0.9)
IRON SATN MFR SERPL: 25 UG/DL — LOW (ref 45–165)
IRON SATN MFR SERPL: 9 % — LOW (ref 14–50)
LIPID PNL WITH DIRECT LDL SERPL: 129 MG/DL — HIGH
LYMPHOCYTES # BLD AUTO: 1.08 K/UL — SIGNIFICANT CHANGE UP (ref 1–3.3)
LYMPHOCYTES # BLD AUTO: 14.2 % — SIGNIFICANT CHANGE UP (ref 13–44)
MAGNESIUM SERPL-MCNC: 1.8 MG/DL — SIGNIFICANT CHANGE UP (ref 1.6–2.6)
MCHC RBC-ENTMCNC: 27 PG — SIGNIFICANT CHANGE UP (ref 27–34)
MCHC RBC-ENTMCNC: 31.7 GM/DL — LOW (ref 32–36)
MCV RBC AUTO: 85 FL — SIGNIFICANT CHANGE UP (ref 80–100)
MONOCYTES # BLD AUTO: 1.15 K/UL — HIGH (ref 0–0.9)
MONOCYTES NFR BLD AUTO: 15.1 % — HIGH (ref 2–14)
NEUTROPHILS # BLD AUTO: 5.14 K/UL — SIGNIFICANT CHANGE UP (ref 1.8–7.4)
NEUTROPHILS NFR BLD AUTO: 67.3 % — SIGNIFICANT CHANGE UP (ref 43–77)
NON HDL CHOLESTEROL: 151 MG/DL — HIGH
NRBC # BLD: 0 /100 WBCS — SIGNIFICANT CHANGE UP (ref 0–0)
NRBC # FLD: 0 K/UL — SIGNIFICANT CHANGE UP (ref 0–0)
PHOSPHATE SERPL-MCNC: 4.8 MG/DL — HIGH (ref 2.5–4.5)
PLATELET # BLD AUTO: 360 K/UL — SIGNIFICANT CHANGE UP (ref 150–400)
POTASSIUM SERPL-MCNC: 3.9 MMOL/L — SIGNIFICANT CHANGE UP (ref 3.5–5.3)
POTASSIUM SERPL-SCNC: 3.9 MMOL/L — SIGNIFICANT CHANGE UP (ref 3.5–5.3)
RBC # BLD: 3.93 M/UL — LOW (ref 4.2–5.8)
RBC # FLD: 13.3 % — SIGNIFICANT CHANGE UP (ref 10.3–14.5)
SODIUM SERPL-SCNC: 136 MMOL/L — SIGNIFICANT CHANGE UP (ref 135–145)
SPECIMEN SOURCE: SIGNIFICANT CHANGE UP
TIBC SERPL-MCNC: 268 UG/DL — SIGNIFICANT CHANGE UP (ref 220–430)
TRIGL SERPL-MCNC: 110 MG/DL — SIGNIFICANT CHANGE UP
TSH SERPL-MCNC: 0.86 UIU/ML — SIGNIFICANT CHANGE UP (ref 0.27–4.2)
UIBC SERPL-MCNC: 243 UG/DL — SIGNIFICANT CHANGE UP (ref 110–370)
VANCOMYCIN TROUGH SERPL-MCNC: 13.5 UG/ML — SIGNIFICANT CHANGE UP (ref 10–20)
WBC # BLD: 7.62 K/UL — SIGNIFICANT CHANGE UP (ref 3.8–10.5)
WBC # FLD AUTO: 7.62 K/UL — SIGNIFICANT CHANGE UP (ref 3.8–10.5)

## 2024-04-12 PROCEDURE — 99232 SBSQ HOSP IP/OBS MODERATE 35: CPT

## 2024-04-12 RX ORDER — OXYCODONE HYDROCHLORIDE 5 MG/1
5 TABLET ORAL EVERY 8 HOURS
Refills: 0 | Status: DISCONTINUED | OUTPATIENT
Start: 2024-04-12 | End: 2024-04-12

## 2024-04-12 RX ORDER — ATORVASTATIN CALCIUM 80 MG/1
20 TABLET, FILM COATED ORAL AT BEDTIME
Refills: 0 | Status: DISCONTINUED | OUTPATIENT
Start: 2024-04-12 | End: 2024-04-15

## 2024-04-12 RX ORDER — CEFEPIME 1 G/1
2000 INJECTION, POWDER, FOR SOLUTION INTRAMUSCULAR; INTRAVENOUS EVERY 8 HOURS
Refills: 0 | Status: DISCONTINUED | OUTPATIENT
Start: 2024-04-12 | End: 2024-04-15

## 2024-04-12 RX ORDER — OXYCODONE HYDROCHLORIDE 5 MG/1
5 TABLET ORAL EVERY 8 HOURS
Refills: 0 | Status: DISCONTINUED | OUTPATIENT
Start: 2024-04-12 | End: 2024-04-15

## 2024-04-12 RX ORDER — CEFEPIME 1 G/1
2000 INJECTION, POWDER, FOR SOLUTION INTRAMUSCULAR; INTRAVENOUS ONCE
Refills: 0 | Status: COMPLETED | OUTPATIENT
Start: 2024-04-12 | End: 2024-04-12

## 2024-04-12 RX ORDER — OXYCODONE HYDROCHLORIDE 5 MG/1
7.5 TABLET ORAL EVERY 8 HOURS
Refills: 0 | Status: DISCONTINUED | OUTPATIENT
Start: 2024-04-12 | End: 2024-04-15

## 2024-04-12 RX ORDER — APIXABAN 2.5 MG/1
5 TABLET, FILM COATED ORAL EVERY 12 HOURS
Refills: 0 | Status: DISCONTINUED | OUTPATIENT
Start: 2024-04-12 | End: 2024-04-15

## 2024-04-12 RX ORDER — HYDROMORPHONE HYDROCHLORIDE 2 MG/ML
0.5 INJECTION INTRAMUSCULAR; INTRAVENOUS; SUBCUTANEOUS ONCE
Refills: 0 | Status: DISCONTINUED | OUTPATIENT
Start: 2024-04-12 | End: 2024-04-12

## 2024-04-12 RX ORDER — CEFEPIME 1 G/1
INJECTION, POWDER, FOR SOLUTION INTRAMUSCULAR; INTRAVENOUS
Refills: 0 | Status: DISCONTINUED | OUTPATIENT
Start: 2024-04-12 | End: 2024-04-15

## 2024-04-12 RX ADMIN — GABAPENTIN 300 MILLIGRAM(S): 400 CAPSULE ORAL at 21:52

## 2024-04-12 RX ADMIN — GABAPENTIN 300 MILLIGRAM(S): 400 CAPSULE ORAL at 06:18

## 2024-04-12 RX ADMIN — GABAPENTIN 300 MILLIGRAM(S): 400 CAPSULE ORAL at 13:31

## 2024-04-12 RX ADMIN — CEFEPIME 100 MILLIGRAM(S): 1 INJECTION, POWDER, FOR SOLUTION INTRAMUSCULAR; INTRAVENOUS at 16:30

## 2024-04-12 RX ADMIN — Medication 1 TABLET(S): at 18:39

## 2024-04-12 RX ADMIN — APIXABAN 5 MILLIGRAM(S): 2.5 TABLET, FILM COATED ORAL at 18:39

## 2024-04-12 RX ADMIN — CHLORHEXIDINE GLUCONATE 1 APPLICATION(S): 213 SOLUTION TOPICAL at 10:06

## 2024-04-12 RX ADMIN — CEFEPIME 100 MILLIGRAM(S): 1 INJECTION, POWDER, FOR SOLUTION INTRAMUSCULAR; INTRAVENOUS at 21:53

## 2024-04-12 RX ADMIN — OXYCODONE HYDROCHLORIDE 5 MILLIGRAM(S): 5 TABLET ORAL at 08:05

## 2024-04-12 RX ADMIN — Medication 25 MILLIGRAM(S): at 06:18

## 2024-04-12 RX ADMIN — Medication 250 MILLIGRAM(S): at 10:06

## 2024-04-12 RX ADMIN — SENNA PLUS 2 TABLET(S): 8.6 TABLET ORAL at 21:52

## 2024-04-12 RX ADMIN — ATORVASTATIN CALCIUM 20 MILLIGRAM(S): 80 TABLET, FILM COATED ORAL at 21:51

## 2024-04-12 RX ADMIN — HYDROMORPHONE HYDROCHLORIDE 0.5 MILLIGRAM(S): 2 INJECTION INTRAMUSCULAR; INTRAVENOUS; SUBCUTANEOUS at 03:01

## 2024-04-12 RX ADMIN — Medication 250 MILLIGRAM(S): at 02:27

## 2024-04-12 RX ADMIN — OXYCODONE HYDROCHLORIDE 5 MILLIGRAM(S): 5 TABLET ORAL at 07:10

## 2024-04-12 RX ADMIN — Medication 25 MILLIGRAM(S): at 13:32

## 2024-04-12 RX ADMIN — Medication 25 MILLIGRAM(S): at 21:52

## 2024-04-12 RX ADMIN — Medication 1 TABLET(S): at 13:32

## 2024-04-12 RX ADMIN — HYDROMORPHONE HYDROCHLORIDE 0.5 MILLIGRAM(S): 2 INJECTION INTRAMUSCULAR; INTRAVENOUS; SUBCUTANEOUS at 02:50

## 2024-04-12 NOTE — DISCHARGE NOTE PROVIDER - NSDCMRMEDTOKEN_GEN_ALL_CORE_FT
bacitracin 500 units/g topical ointment: Apply topically to affected area  Eliquis 5 mg oral tablet: 1 tab(s) orally 2 times a day  gabapentin 300 mg oral capsule: 1 cap(s) orally 3 times a day  Keflex 500 mg oral capsule: 1 cap(s) orally  metoprolol tartrate 50 mg oral tablet: 0.5 tab(s) orally every 8 hours  mupirocin 2% topical cream: Apply topically to affected area  oxyCODONE 5 mg oral capsule: 1 cap(s) orally   amoxicillin-clavulanate 875 mg-125 mg oral tablet: 1 tab(s) orally 2 times a day thru 4/18/24  atorvastatin 20 mg oral tablet: 1 tab(s) orally once a day (at bedtime)  cholecalciferol oral tablet: 2,000 unit(s) orally once a day 2000 unit(s) orally once a day  Eliquis 5 mg oral tablet: 1 tab(s) orally 2 times a day  gabapentin 300 mg oral capsule: 1 cap(s) orally 3 times a day do not take if lethargic, do not drive  lactobacillus acidophilus oral capsule: 1 cap(s) orally 3 times a day (with meals)  metoprolol tartrate 50 mg oral tablet: 0.5 tab(s) orally every 8 hours 25mg dose  mupirocin 2% topical ointment: Apply topically to affected area 2 times a day 1 Apply topically to affected area 2 times a day to Left forearm ulcer  oxyCODONE 5 mg oral capsule: 1 cap(s) orally every 6 hours as needed for  severe pain followup with your PCP
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
## 2024-04-12 NOTE — DISCHARGE NOTE PROVIDER - NSDCACTIVITY_GEN_ALL_CORE
No heavy lifting/straining/Follow Instructions Provided by your Surgical Team Do not drive or operate machinery/Showering allowed/Walking - Indoors allowed/No heavy lifting/straining/Follow Instructions Provided by your Surgical Team

## 2024-04-12 NOTE — DISCHARGE NOTE PROVIDER - CARE PROVIDERS DIRECT ADDRESSES
[email or web address]
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
## 2024-04-12 NOTE — DISCHARGE NOTE PROVIDER - NSDCFUADDINST_GEN_ALL_CORE_FT
- Left hand splint in place - keep clean and dry, keep elevated, ROM fingers ok, NWB left hand, ok for WBAT through elbow - Donor site left proximal thigh - keep dressing in place, reinforce as needed with ABDs and ACE for any drainage once anticoagulation started - Left hand splint in place - keep clean and dry, keep elevated, ROM fingers ok to do; Non-Weight bearing to left hand; ok for WBAT (weight bearing as tolerated) through elbow  - Donor site left proximal thigh - keep dressing in place, reinforce as needed with Abdominal pads and ACE wrap for any drainage

## 2024-04-12 NOTE — DISCHARGE NOTE PROVIDER - NSFOLLOWUPCLINICS_GEN_ALL_ED_FT
[facility] - Infectious Disease  Infectious Disease  [address], [place] [address]  Phone: [phone]  Fax:   Follow Up Time: 1 week    [facility] Orthopedic Surgery  Orthopedic Surgery  [address], 3rd & 4th [place] [address]  Phone: [phone]  Fax:     [facility] - [place]  Dermatology  [address] [place] [address], [address]  [place] [address]  Phone: [phone]  Fax: [phone]

## 2024-04-12 NOTE — OCCUPATIONAL THERAPY INITIAL EVALUATION ADULT - RANGE OF MOTION EXAMINATION, UPPER EXTREMITY
Right UE active assisted ROM: shoulder 0-90 degrees, elbow/wrist/hand WFL. Left UE active assisted ROM: shoulder 0-100 degrees, elbow WFL, wrist not assessed due to dressing, able to flex/extend digits

## 2024-04-12 NOTE — DISCHARGE NOTE PROVIDER - NSDCHHNEEDSERVICE_GEN_ALL_CORE
Medication teaching and assessment/Observation and assessment/Teaching and training Medication teaching and assessment/Observation and assessment/Rehabilitation services/Teaching and training/Wound care and assessment/Other, specify...

## 2024-04-12 NOTE — DISCHARGE NOTE PROVIDER - CARE PROVIDER_API CALL
[facility]  Internal Medicine  [address] [place] [address]  Phone: [phone]  Fax: [phone]  Follow Up Time: 2 weeks   Karen Amaya  Internal Medicine  [address] [place] [address]  Phone: [phone]  Fax: [phone]  Follow Up Time: 1 week    Wayne Valderrama  Plastic Surgery  [address] [place] [address]  Phone: [phone]  Fax: [phone]  Follow Up Time: 1 week    Matt Ulloa  Infectious Disease  [address] [place] [address]  Phone: [phone]  Fax: [phone]  Follow Up Time: 1 week

## 2024-04-12 NOTE — DISCHARGE NOTE PROVIDER - PROVIDER TOKENS
PROVIDER:[TOKEN:[[id number]],FOLLOWUP:[2 weeks]] PROVIDER:[TOKEN:[[id number]],FOLLOWUP:[1 week]],PROVIDER:[TOKEN:[[id number]],FOLLOWUP:[1 week]],PROVIDER:[TOKEN:[[id number]],FOLLOWUP:[1 week]]
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
## 2024-04-12 NOTE — DISCHARGE NOTE PROVIDER - NSDCFUADDAPPT_GEN_ALL_CORE_FT
Wayne Valderrama MD  Plastic, Reconstructive, & Hand Surgery  [facility]  [phone]  7-10 days for wound check  ***Repeat bloodwork with your PCP within 1-2 weeks (CBC, BMP, LFTs)     ****You have to followup with Plastics, Reconstructive, & Hand Surgery within 3-5days for a wound check, call office to make appointment and state you were seen in hospital and had surgery  Dr. Wayne Valderrama, [facility], , [phone]  Recommend outpatient followup with Orthopedics trauma upon discharge     On you complete antibiotics and infection has resolved, recommend Oral Iron supplement given iron study results, followup with your PCP     **Once you complete your antibiotic course, you should followup with Outpatient ID Clinic (Dr. Ulloa) within 1-2 weeks for Surveillance.     Can follow up with [facility] located at [address] [place] [address] [address], [place] [address] upon discharge. Their office will call to schedule an appointment but if you do not hear within a 1-3days of discharge, please CALL our office. Office phone number is [phone].

## 2024-04-12 NOTE — DISCHARGE NOTE PROVIDER - NSDCCPTREATMENT_GEN_ALL_CORE_FT
PRINCIPAL PROCEDURE  Procedure: Transthoracic echocardiography (TTE)  Findings and Treatment: CONCLUSIONS:      1. Technically difficult image quality.   2. Left ventricular cavity is normal in size. Left ventricular wall thickness is normal.   3. Normal right ventricular cavity size and probably normal systolic function.   4. The left atrium is normal.   5. The right atrium is normal in size.   6. Tricuspid aortic valve with normal leaflet excursion with normal systolic excursion.   7. Structurally normal mitral valve with normal leaflet excursion.

## 2024-04-13 LAB
-  AMOXICILLIN/CLAVULANIC ACID: SIGNIFICANT CHANGE UP
-  AMPICILLIN/SULBACTAM: SIGNIFICANT CHANGE UP
-  AMPICILLIN: SIGNIFICANT CHANGE UP
-  AZTREONAM: SIGNIFICANT CHANGE UP
-  CEFAZOLIN: SIGNIFICANT CHANGE UP
-  CEFEPIME: SIGNIFICANT CHANGE UP
-  CEFOXITIN: SIGNIFICANT CHANGE UP
-  CEFTRIAXONE: SIGNIFICANT CHANGE UP
-  CIPROFLOXACIN: SIGNIFICANT CHANGE UP
-  ERTAPENEM: SIGNIFICANT CHANGE UP
-  GENTAMICIN: SIGNIFICANT CHANGE UP
-  IMIPENEM: SIGNIFICANT CHANGE UP
-  LEVOFLOXACIN: SIGNIFICANT CHANGE UP
-  MEROPENEM: SIGNIFICANT CHANGE UP
-  PIPERACILLIN/TAZOBACTAM: SIGNIFICANT CHANGE UP
-  TOBRAMYCIN: SIGNIFICANT CHANGE UP
-  TRIMETHOPRIM/SULFAMETHOXAZOLE: SIGNIFICANT CHANGE UP
24R-OH-CALCIDIOL SERPL-MCNC: 16.4 NG/ML — LOW (ref 30–80)
ANION GAP SERPL CALC-SCNC: 14 MMOL/L — SIGNIFICANT CHANGE UP (ref 7–14)
BUN SERPL-MCNC: 14 MG/DL — SIGNIFICANT CHANGE UP (ref 7–23)
CALCIUM SERPL-MCNC: 9.6 MG/DL — SIGNIFICANT CHANGE UP (ref 8.4–10.5)
CHLORIDE SERPL-SCNC: 97 MMOL/L — LOW (ref 98–107)
CO2 SERPL-SCNC: 23 MMOL/L — SIGNIFICANT CHANGE UP (ref 22–31)
CREAT SERPL-MCNC: 1.02 MG/DL — SIGNIFICANT CHANGE UP (ref 0.5–1.3)
EGFR: 88 ML/MIN/1.73M2 — SIGNIFICANT CHANGE UP
FOLATE SERPL-MCNC: 4.2 NG/ML — SIGNIFICANT CHANGE UP (ref 3.1–17.5)
GLUCOSE SERPL-MCNC: 95 MG/DL — SIGNIFICANT CHANGE UP (ref 70–99)
HCT VFR BLD CALC: 33 % — LOW (ref 39–50)
HGB BLD-MCNC: 10.9 G/DL — LOW (ref 13–17)
MAGNESIUM SERPL-MCNC: 1.8 MG/DL — SIGNIFICANT CHANGE UP (ref 1.6–2.6)
MCHC RBC-ENTMCNC: 27.9 PG — SIGNIFICANT CHANGE UP (ref 27–34)
MCHC RBC-ENTMCNC: 33 GM/DL — SIGNIFICANT CHANGE UP (ref 32–36)
MCV RBC AUTO: 84.6 FL — SIGNIFICANT CHANGE UP (ref 80–100)
METHOD TYPE: SIGNIFICANT CHANGE UP
NRBC # BLD: 0 /100 WBCS — SIGNIFICANT CHANGE UP (ref 0–0)
NRBC # FLD: 0 K/UL — SIGNIFICANT CHANGE UP (ref 0–0)
PHOSPHATE SERPL-MCNC: 4.8 MG/DL — HIGH (ref 2.5–4.5)
PLATELET # BLD AUTO: 305 K/UL — SIGNIFICANT CHANGE UP (ref 150–400)
POTASSIUM SERPL-MCNC: 4.4 MMOL/L — SIGNIFICANT CHANGE UP (ref 3.5–5.3)
POTASSIUM SERPL-SCNC: 4.4 MMOL/L — SIGNIFICANT CHANGE UP (ref 3.5–5.3)
RBC # BLD: 3.9 M/UL — LOW (ref 4.2–5.8)
RBC # FLD: 13.5 % — SIGNIFICANT CHANGE UP (ref 10.3–14.5)
SODIUM SERPL-SCNC: 134 MMOL/L — LOW (ref 135–145)
VIT B12 SERPL-MCNC: 497 PG/ML — SIGNIFICANT CHANGE UP (ref 200–900)
WBC # BLD: 7.65 K/UL — SIGNIFICANT CHANGE UP (ref 3.8–10.5)
WBC # FLD AUTO: 7.65 K/UL — SIGNIFICANT CHANGE UP (ref 3.8–10.5)

## 2024-04-13 PROCEDURE — 99254 IP/OBS CNSLTJ NEW/EST MOD 60: CPT

## 2024-04-13 PROCEDURE — 99233 SBSQ HOSP IP/OBS HIGH 50: CPT

## 2024-04-13 RX ORDER — CHOLECALCIFEROL (VITAMIN D3) 125 MCG
2000 CAPSULE ORAL DAILY
Refills: 0 | Status: DISCONTINUED | OUTPATIENT
Start: 2024-04-13 | End: 2024-04-15

## 2024-04-13 RX ADMIN — ATORVASTATIN CALCIUM 20 MILLIGRAM(S): 80 TABLET, FILM COATED ORAL at 23:05

## 2024-04-13 RX ADMIN — CEFEPIME 100 MILLIGRAM(S): 1 INJECTION, POWDER, FOR SOLUTION INTRAMUSCULAR; INTRAVENOUS at 13:48

## 2024-04-13 RX ADMIN — Medication 25 MILLIGRAM(S): at 23:05

## 2024-04-13 RX ADMIN — Medication 1 APPLICATION(S): at 11:55

## 2024-04-13 RX ADMIN — OXYCODONE HYDROCHLORIDE 7.5 MILLIGRAM(S): 5 TABLET ORAL at 17:00

## 2024-04-13 RX ADMIN — GABAPENTIN 300 MILLIGRAM(S): 400 CAPSULE ORAL at 23:05

## 2024-04-13 RX ADMIN — CEFEPIME 100 MILLIGRAM(S): 1 INJECTION, POWDER, FOR SOLUTION INTRAMUSCULAR; INTRAVENOUS at 23:05

## 2024-04-13 RX ADMIN — Medication 25 MILLIGRAM(S): at 13:48

## 2024-04-13 RX ADMIN — APIXABAN 5 MILLIGRAM(S): 2.5 TABLET, FILM COATED ORAL at 05:57

## 2024-04-13 RX ADMIN — Medication 1 APPLICATION(S): at 18:12

## 2024-04-13 RX ADMIN — Medication 25 MILLIGRAM(S): at 05:57

## 2024-04-13 RX ADMIN — OXYCODONE HYDROCHLORIDE 7.5 MILLIGRAM(S): 5 TABLET ORAL at 01:28

## 2024-04-13 RX ADMIN — OXYCODONE HYDROCHLORIDE 7.5 MILLIGRAM(S): 5 TABLET ORAL at 15:24

## 2024-04-13 RX ADMIN — APIXABAN 5 MILLIGRAM(S): 2.5 TABLET, FILM COATED ORAL at 18:11

## 2024-04-13 RX ADMIN — Medication 2000 UNIT(S): at 13:48

## 2024-04-13 RX ADMIN — Medication 1 TABLET(S): at 09:25

## 2024-04-13 RX ADMIN — Medication 1 TABLET(S): at 11:55

## 2024-04-13 RX ADMIN — GABAPENTIN 300 MILLIGRAM(S): 400 CAPSULE ORAL at 05:57

## 2024-04-13 RX ADMIN — CEFEPIME 100 MILLIGRAM(S): 1 INJECTION, POWDER, FOR SOLUTION INTRAMUSCULAR; INTRAVENOUS at 05:57

## 2024-04-13 RX ADMIN — SENNA PLUS 2 TABLET(S): 8.6 TABLET ORAL at 23:05

## 2024-04-13 RX ADMIN — GABAPENTIN 300 MILLIGRAM(S): 400 CAPSULE ORAL at 13:48

## 2024-04-13 RX ADMIN — OXYCODONE HYDROCHLORIDE 7.5 MILLIGRAM(S): 5 TABLET ORAL at 00:28

## 2024-04-13 RX ADMIN — Medication 1 APPLICATION(S): at 06:31

## 2024-04-13 RX ADMIN — Medication 1 TABLET(S): at 18:11

## 2024-04-13 RX ADMIN — CHLORHEXIDINE GLUCONATE 1 APPLICATION(S): 213 SOLUTION TOPICAL at 06:18
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
## 2024-04-13 NOTE — CONSULT NOTE ADULT - CONSULT REQUESTED BY NAME
From: Gómez Rodriges  To: Ruy Rodgers  Sent: 5/12/2022 4:45 PM CDT  Subject: Stelara Injection    Hi Dr. Tatum Nieto, everyone,  I cannot get my prescription from the [facility] again. The last time I had a Stelara injection was March 15th and it was the one I picked up from your office. They say the prior approval for every 6 weeks hasn't gone through once again. I've gotten an email from them saying it was approved but when I call they say it hasn't!!! I'm just worried I'm going to have a flare or something. I'm totally fine with taking it every 8 weeks instead of 6 weeks (which is what they seem to have problems with). Please help!   Thanks,  Virginia Higgins
Stelara 90mg (every 6 weeks) - Â Approved -Ready to fill   Â   Authorization Number: [id number]  Valid from 01/11/2022 to 01/11/2023  Â   Prescription already on file with [facility]   Pharmacy Coverage [employer]   Patient's Co-Pay: $unknown
Trisha, please see below message from pt ,   Please let me know if you ban help with this
Medicine
medicine
self
primary team
Eh aWy
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
## 2024-04-13 NOTE — CONSULT NOTE ADULT - PROVIDER SPECIALTY LIST ADULT
Vascular Surgery
[FreeTextEntry1] : 53 M PMHx of DM2, HTN, HLD, NSTEMI 11/16/19 s/p LHC w/ KHARI to 60% pCX and 100% OM2, now s/p 2V CABG 8/18/2020 for diabetes follow up and fatigue. \par \par #T2DM, uncontrolled\par -POCT A1C 10.\par -unable to acquire Free style [place] 2/2 to lack of insurance \par -advised to continue Humalin 70/30 20U AM but only take 12U PM. Also advised to keep a log of sugars, book provided. \par -C/w Metformin 1000 BID\par -We are in the process of completing paperwork for trulicity (glp-1) \par -Seen by optho\par -Dietician referral\par \par #HTN\par -c/w lisinoprol\par -decrease to metoprolol 25mg QD given patient is HYPOtensive. Perhaps this will also improve his fatigue symptoms \par \par #SOB and fatigue\par -EKG NSR without ischemic changes, similar to previous\par -Euvolemic on exam\par -SOB likely 2/2 deconditioning and CP likely MSK or 2/2 cardiax Sx\par -Continue to monitor\par -PT referral given\par \par #HCM\par -given prevnar 20 today\par \par RTC in 2 weeks for BP check up and T2DM check
Infectious Disease
Orthopedics
Dermatology
Orthopedics

## 2024-04-13 NOTE — CONSULT NOTE ADULT - ASSESSMENT
ASSESSMENT/PLAN:     # L forearm ulcer, possibly iatrogenic 2/2 IV sticks  - Start Mupirocin 2% ointment BID to affected area    # Hemorrhagic macules on distal L fingers  - Favor resolving vesicles, hemorrhagic i/s/o anticoagulation  - Continue to monitor  - Low suspicion for embolic etiology or infection/ecthyma    Thank you for this consult. Patient was seen and reviewed with attending dermatologist Dr. Baron    Dermatology will sign off. Please page [phone] with a 10-digit call-back number for further related questions.    Patient can follow up with us in the [facility] located at [address] [place] [address] [address] [address] upon discharge. Our office will call to schedule an appointment but if patient does not hear from us within a few days of discharge, please instruct patient to call our office. Office phone number is [phone].    Evi Clifford MD  Resident Physician, PGY-2   [facility]   Pager: [phone]

## 2024-04-14 LAB
-  AMOXICILLIN/CLAVULANIC ACID: SIGNIFICANT CHANGE UP
-  AMPICILLIN/SULBACTAM: SIGNIFICANT CHANGE UP
-  AMPICILLIN: SIGNIFICANT CHANGE UP
-  AZTREONAM: SIGNIFICANT CHANGE UP
-  CEFAZOLIN: SIGNIFICANT CHANGE UP
-  CEFEPIME: SIGNIFICANT CHANGE UP
-  CEFOXITIN: SIGNIFICANT CHANGE UP
-  CEFTRIAXONE: SIGNIFICANT CHANGE UP
-  CIPROFLOXACIN: SIGNIFICANT CHANGE UP
-  ERTAPENEM: SIGNIFICANT CHANGE UP
-  GENTAMICIN: SIGNIFICANT CHANGE UP
-  LEVOFLOXACIN: SIGNIFICANT CHANGE UP
-  MEROPENEM: SIGNIFICANT CHANGE UP
-  PIPERACILLIN/TAZOBACTAM: SIGNIFICANT CHANGE UP
-  TOBRAMYCIN: SIGNIFICANT CHANGE UP
-  TRIMETHOPRIM/SULFAMETHOXAZOLE: SIGNIFICANT CHANGE UP
ANION GAP SERPL CALC-SCNC: 15 MMOL/L — HIGH (ref 7–14)
BUN SERPL-MCNC: 16 MG/DL — SIGNIFICANT CHANGE UP (ref 7–23)
CALCIUM SERPL-MCNC: 9.6 MG/DL — SIGNIFICANT CHANGE UP (ref 8.4–10.5)
CHLORIDE SERPL-SCNC: 97 MMOL/L — LOW (ref 98–107)
CO2 SERPL-SCNC: 23 MMOL/L — SIGNIFICANT CHANGE UP (ref 22–31)
CREAT SERPL-MCNC: 1.01 MG/DL — SIGNIFICANT CHANGE UP (ref 0.5–1.3)
EGFR: 89 ML/MIN/1.73M2 — SIGNIFICANT CHANGE UP
GLUCOSE SERPL-MCNC: 121 MG/DL — HIGH (ref 70–99)
HCT VFR BLD CALC: 37 % — LOW (ref 39–50)
HGB BLD-MCNC: 11.6 G/DL — LOW (ref 13–17)
MAGNESIUM SERPL-MCNC: 1.9 MG/DL — SIGNIFICANT CHANGE UP (ref 1.6–2.6)
MCHC RBC-ENTMCNC: 27.6 PG — SIGNIFICANT CHANGE UP (ref 27–34)
MCHC RBC-ENTMCNC: 31.4 GM/DL — LOW (ref 32–36)
MCV RBC AUTO: 88.1 FL — SIGNIFICANT CHANGE UP (ref 80–100)
METHOD TYPE: SIGNIFICANT CHANGE UP
NRBC # BLD: 0 /100 WBCS — SIGNIFICANT CHANGE UP (ref 0–0)
NRBC # FLD: 0 K/UL — SIGNIFICANT CHANGE UP (ref 0–0)
PHOSPHATE SERPL-MCNC: 3.9 MG/DL — SIGNIFICANT CHANGE UP (ref 2.5–4.5)
PLATELET # BLD AUTO: 301 K/UL — SIGNIFICANT CHANGE UP (ref 150–400)
POTASSIUM SERPL-MCNC: 4.2 MMOL/L — SIGNIFICANT CHANGE UP (ref 3.5–5.3)
POTASSIUM SERPL-SCNC: 4.2 MMOL/L — SIGNIFICANT CHANGE UP (ref 3.5–5.3)
RBC # BLD: 4.2 M/UL — SIGNIFICANT CHANGE UP (ref 4.2–5.8)
RBC # FLD: 13.4 % — SIGNIFICANT CHANGE UP (ref 10.3–14.5)
SODIUM SERPL-SCNC: 135 MMOL/L — SIGNIFICANT CHANGE UP (ref 135–145)
WBC # BLD: 7.7 K/UL — SIGNIFICANT CHANGE UP (ref 3.8–10.5)
WBC # FLD AUTO: 7.7 K/UL — SIGNIFICANT CHANGE UP (ref 3.8–10.5)

## 2024-04-14 PROCEDURE — 99232 SBSQ HOSP IP/OBS MODERATE 35: CPT

## 2024-04-14 RX ORDER — MUPIROCIN 20 MG/G
1 OINTMENT TOPICAL
Refills: 0 | Status: DISCONTINUED | OUTPATIENT
Start: 2024-04-14 | End: 2024-04-15

## 2024-04-14 RX ADMIN — GABAPENTIN 300 MILLIGRAM(S): 400 CAPSULE ORAL at 14:56

## 2024-04-14 RX ADMIN — MUPIROCIN 1 APPLICATION(S): 20 OINTMENT TOPICAL at 22:28

## 2024-04-14 RX ADMIN — GABAPENTIN 300 MILLIGRAM(S): 400 CAPSULE ORAL at 06:08

## 2024-04-14 RX ADMIN — Medication 25 MILLIGRAM(S): at 22:24

## 2024-04-14 RX ADMIN — Medication 1 APPLICATION(S): at 17:09

## 2024-04-14 RX ADMIN — OXYCODONE HYDROCHLORIDE 5 MILLIGRAM(S): 5 TABLET ORAL at 13:20

## 2024-04-14 RX ADMIN — CEFEPIME 100 MILLIGRAM(S): 1 INJECTION, POWDER, FOR SOLUTION INTRAMUSCULAR; INTRAVENOUS at 14:57

## 2024-04-14 RX ADMIN — Medication 1 APPLICATION(S): at 06:07

## 2024-04-14 RX ADMIN — APIXABAN 5 MILLIGRAM(S): 2.5 TABLET, FILM COATED ORAL at 06:07

## 2024-04-14 RX ADMIN — SENNA PLUS 2 TABLET(S): 8.6 TABLET ORAL at 22:23

## 2024-04-14 RX ADMIN — Medication 1 TABLET(S): at 17:10

## 2024-04-14 RX ADMIN — CEFEPIME 100 MILLIGRAM(S): 1 INJECTION, POWDER, FOR SOLUTION INTRAMUSCULAR; INTRAVENOUS at 22:24

## 2024-04-14 RX ADMIN — GABAPENTIN 300 MILLIGRAM(S): 400 CAPSULE ORAL at 22:22

## 2024-04-14 RX ADMIN — Medication 1 APPLICATION(S): at 11:33

## 2024-04-14 RX ADMIN — Medication 2000 UNIT(S): at 11:34

## 2024-04-14 RX ADMIN — APIXABAN 5 MILLIGRAM(S): 2.5 TABLET, FILM COATED ORAL at 17:10

## 2024-04-14 RX ADMIN — Medication 25 MILLIGRAM(S): at 06:08

## 2024-04-14 RX ADMIN — Medication 25 MILLIGRAM(S): at 15:19

## 2024-04-14 RX ADMIN — CHLORHEXIDINE GLUCONATE 1 APPLICATION(S): 213 SOLUTION TOPICAL at 06:08

## 2024-04-14 RX ADMIN — ATORVASTATIN CALCIUM 20 MILLIGRAM(S): 80 TABLET, FILM COATED ORAL at 22:23

## 2024-04-14 RX ADMIN — Medication 1 TABLET(S): at 11:34

## 2024-04-14 RX ADMIN — Medication 1 TABLET(S): at 09:20

## 2024-04-14 RX ADMIN — CEFEPIME 100 MILLIGRAM(S): 1 INJECTION, POWDER, FOR SOLUTION INTRAMUSCULAR; INTRAVENOUS at 06:06
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
## 2024-04-15 ENCOUNTER — TRANSCRIPTION ENCOUNTER (OUTPATIENT)
Age: 54
End: 2024-04-15

## 2024-04-15 VITALS
HEART RATE: 89 BPM | OXYGEN SATURATION: 98 % | RESPIRATION RATE: 18 BRPM | SYSTOLIC BLOOD PRESSURE: 127 MMHG | DIASTOLIC BLOOD PRESSURE: 90 MMHG | TEMPERATURE: 98 F

## 2024-04-15 LAB
ANION GAP SERPL CALC-SCNC: 16 MMOL/L — HIGH (ref 7–14)
BUN SERPL-MCNC: 15 MG/DL — SIGNIFICANT CHANGE UP (ref 7–23)
CALCIUM SERPL-MCNC: 9.6 MG/DL — SIGNIFICANT CHANGE UP (ref 8.4–10.5)
CHLORIDE SERPL-SCNC: 99 MMOL/L — SIGNIFICANT CHANGE UP (ref 98–107)
CO2 SERPL-SCNC: 22 MMOL/L — SIGNIFICANT CHANGE UP (ref 22–31)
CREAT SERPL-MCNC: 1 MG/DL — SIGNIFICANT CHANGE UP (ref 0.5–1.3)
EGFR: 90 ML/MIN/1.73M2 — SIGNIFICANT CHANGE UP
GLUCOSE SERPL-MCNC: 104 MG/DL — HIGH (ref 70–99)
HCT VFR BLD CALC: 33.7 % — LOW (ref 39–50)
HGB BLD-MCNC: 11.2 G/DL — LOW (ref 13–17)
MAGNESIUM SERPL-MCNC: 1.8 MG/DL — SIGNIFICANT CHANGE UP (ref 1.6–2.6)
MCHC RBC-ENTMCNC: 28.1 PG — SIGNIFICANT CHANGE UP (ref 27–34)
MCHC RBC-ENTMCNC: 33.2 GM/DL — SIGNIFICANT CHANGE UP (ref 32–36)
MCV RBC AUTO: 84.5 FL — SIGNIFICANT CHANGE UP (ref 80–100)
NRBC # BLD: 0 /100 WBCS — SIGNIFICANT CHANGE UP (ref 0–0)
NRBC # FLD: 0 K/UL — SIGNIFICANT CHANGE UP (ref 0–0)
PHOSPHATE SERPL-MCNC: 4.7 MG/DL — HIGH (ref 2.5–4.5)
PLATELET # BLD AUTO: 308 K/UL — SIGNIFICANT CHANGE UP (ref 150–400)
POTASSIUM SERPL-MCNC: 4.3 MMOL/L — SIGNIFICANT CHANGE UP (ref 3.5–5.3)
POTASSIUM SERPL-SCNC: 4.3 MMOL/L — SIGNIFICANT CHANGE UP (ref 3.5–5.3)
RBC # BLD: 3.99 M/UL — LOW (ref 4.2–5.8)
RBC # FLD: 13 % — SIGNIFICANT CHANGE UP (ref 10.3–14.5)
SODIUM SERPL-SCNC: 137 MMOL/L — SIGNIFICANT CHANGE UP (ref 135–145)
WBC # BLD: 7.23 K/UL — SIGNIFICANT CHANGE UP (ref 3.8–10.5)
WBC # FLD AUTO: 7.23 K/UL — SIGNIFICANT CHANGE UP (ref 3.8–10.5)

## 2024-04-15 PROCEDURE — 99232 SBSQ HOSP IP/OBS MODERATE 35: CPT | Mod: 1L

## 2024-04-15 RX ORDER — METOPROLOL TARTRATE 50 MG
0.5 TABLET ORAL
Qty: 0 | Refills: 0 | DISCHARGE

## 2024-04-15 RX ORDER — GABAPENTIN 400 MG/1
1 CAPSULE ORAL
Refills: 0 | DISCHARGE

## 2024-04-15 RX ORDER — BACITRACIN ZINC 500 UNIT/G
1 OINTMENT IN PACKET (EA) TOPICAL
Refills: 0 | DISCHARGE

## 2024-04-15 RX ORDER — GABAPENTIN 400 MG/1
1 CAPSULE ORAL
Qty: 90 | Refills: 0
Start: 2024-04-15 | End: 2024-05-14

## 2024-04-15 RX ORDER — CHOLECALCIFEROL (VITAMIN D3) 125 MCG
2000 CAPSULE ORAL
Qty: 30 | Refills: 0
Start: 2024-04-15 | End: 2024-05-14

## 2024-04-15 RX ORDER — BACITRACIN ZINC 500 UNIT/G
1 OINTMENT IN PACKET (EA) TOPICAL
Qty: 1 | Refills: 0
Start: 2024-04-15 | End: 2024-05-14

## 2024-04-15 RX ORDER — MUPIROCIN 20 MG/G
1 OINTMENT TOPICAL
Refills: 0 | DISCHARGE

## 2024-04-15 RX ORDER — APIXABAN 2.5 MG/1
1 TABLET, FILM COATED ORAL
Qty: 60 | Refills: 0
Start: 2024-04-15 | End: 2024-05-14

## 2024-04-15 RX ORDER — MUPIROCIN 20 MG/G
1 OINTMENT TOPICAL
Qty: 2 | Refills: 0
Start: 2024-04-15 | End: 2024-05-14

## 2024-04-15 RX ORDER — ATORVASTATIN CALCIUM 80 MG/1
1 TABLET, FILM COATED ORAL
Qty: 30 | Refills: 0
Start: 2024-04-15 | End: 2024-05-14

## 2024-04-15 RX ORDER — CEPHALEXIN 500 MG
1 CAPSULE ORAL
Refills: 0 | DISCHARGE

## 2024-04-15 RX ORDER — LACTOBACILLUS ACIDOPHILUS 100MM CELL
1 CAPSULE ORAL
Qty: 42 | Refills: 0
Start: 2024-04-15 | End: 2024-04-28

## 2024-04-15 RX ORDER — APIXABAN 2.5 MG/1
1 TABLET, FILM COATED ORAL
Refills: 0 | DISCHARGE

## 2024-04-15 RX ORDER — CALCIUM ACETATE 667 MG
667 TABLET ORAL ONCE
Refills: 0 | Status: COMPLETED | OUTPATIENT
Start: 2024-04-15 | End: 2024-04-15

## 2024-04-15 RX ADMIN — CEFEPIME 100 MILLIGRAM(S): 1 INJECTION, POWDER, FOR SOLUTION INTRAMUSCULAR; INTRAVENOUS at 05:19

## 2024-04-15 RX ADMIN — MUPIROCIN 1 APPLICATION(S): 20 OINTMENT TOPICAL at 18:38

## 2024-04-15 RX ADMIN — OXYCODONE HYDROCHLORIDE 5 MILLIGRAM(S): 5 TABLET ORAL at 06:19

## 2024-04-15 RX ADMIN — GABAPENTIN 300 MILLIGRAM(S): 400 CAPSULE ORAL at 13:25

## 2024-04-15 RX ADMIN — Medication 1 APPLICATION(S): at 18:38

## 2024-04-15 RX ADMIN — Medication 1 APPLICATION(S): at 05:19

## 2024-04-15 RX ADMIN — OXYCODONE HYDROCHLORIDE 5 MILLIGRAM(S): 5 TABLET ORAL at 15:04

## 2024-04-15 RX ADMIN — Medication 2000 UNIT(S): at 13:40

## 2024-04-15 RX ADMIN — GABAPENTIN 300 MILLIGRAM(S): 400 CAPSULE ORAL at 05:18

## 2024-04-15 RX ADMIN — OXYCODONE HYDROCHLORIDE 5 MILLIGRAM(S): 5 TABLET ORAL at 05:19

## 2024-04-15 RX ADMIN — Medication 667 MILLIGRAM(S): at 09:17

## 2024-04-15 RX ADMIN — CHLORHEXIDINE GLUCONATE 1 APPLICATION(S): 213 SOLUTION TOPICAL at 05:27

## 2024-04-15 RX ADMIN — MUPIROCIN 1 APPLICATION(S): 20 OINTMENT TOPICAL at 05:20

## 2024-04-15 RX ADMIN — Medication 25 MILLIGRAM(S): at 05:18

## 2024-04-15 RX ADMIN — APIXABAN 5 MILLIGRAM(S): 2.5 TABLET, FILM COATED ORAL at 18:38

## 2024-04-15 RX ADMIN — Medication 25 MILLIGRAM(S): at 13:26

## 2024-04-15 RX ADMIN — APIXABAN 5 MILLIGRAM(S): 2.5 TABLET, FILM COATED ORAL at 05:18

## 2024-04-15 RX ADMIN — Medication 1 TABLET(S): at 09:17

## 2024-04-15 RX ADMIN — Medication 1 TABLET(S): at 13:26

## 2024-04-15 RX ADMIN — Medication 1 TABLET(S): at 18:38

## 2024-04-15 RX ADMIN — CEFEPIME 100 MILLIGRAM(S): 1 INJECTION, POWDER, FOR SOLUTION INTRAMUSCULAR; INTRAVENOUS at 13:29

## 2024-04-15 NOTE — PROGRESS NOTE ADULT - PROBLEM SELECTOR PROBLEM 3
Pulmonary embolism

## 2024-04-15 NOTE — PROGRESS NOTE ADULT - PROBLEM SELECTOR PLAN 4
reconciled
reconciled        D/C planing
reconciled

## 2024-04-15 NOTE — DISCHARGE NOTE NURSING/CASE MANAGEMENT/SOCIAL WORK - NSDCPEFALRISK_GEN_ALL_CORE
For information on Fall & Injury Prevention, visit: [email or web address] OR  [email or web address] OR  https://www.cdc.gov/steadi/patient.html

## 2024-04-15 NOTE — PROGRESS NOTE ADULT - PROBLEM SELECTOR PROBLEM 1
Infected wound

## 2024-04-15 NOTE — PROGRESS NOTE ADULT - SUBJECTIVE AND OBJECTIVE BOX
Orthopedic Surgery Progress Note     S: Patient seen and examined today. No acute events overnight. Pain is well controlled. Denies f/c, chest pain, shortness of breath, dizziness.    MEDICATIONS  (STANDING):  apixaban 5 milliGRAM(s) Oral every 12 hours  bacitracin   Ointment 1 Application(s) Topical daily  bacitracin   Ointment 1 Application(s) Topical two times a day  chlorhexidine 2% Cloths 1 Application(s) Topical <User Schedule>  gabapentin 300 milliGRAM(s) Oral every 8 hours  lactobacillus acidophilus 1 Tablet(s) Oral three times a day with meals  lidocaine 1% Injectable 1 Vial(s) Local Injection once  metoprolol tartrate 25 milliGRAM(s) Oral every 8 hours  senna 2 Tablet(s) Oral at bedtime  sodium chloride 0.9%. 1000 milliLiter(s) (100 mL/Hr) IV Continuous <Continuous>  vancomycin  IVPB 1250 milliGRAM(s) IV Intermittent every 12 hours    MEDICATIONS  (PRN):  acetaminophen     Tablet .. 650 milliGRAM(s) Oral every 6 hours PRN Temp greater or equal to 38C (100.4F), Mild Pain (1 - 3)  oxyCODONE    IR 5 milliGRAM(s) Oral every 8 hours PRN Severe Pain (7 - 10)  sodium chloride 0.65% Nasal 1 Spray(s) Both Nostrils two times a day PRN Nasal Congestion      Vital Signs Last 24 Hrs  T(C): 37 (07 Apr 2024 01:33), Max: 37.8 (06 Apr 2024 21:04)  T(F): 98.6 (07 Apr 2024 01:33), Max: 100.1 (06 Apr 2024 21:04)  HR: 93 (07 Apr 2024 01:33) (84 - 99)  BP: 123/75 (07 Apr 2024 01:33) (121/65 - 148/78)  BP(mean): --  RR: 18 (07 Apr 2024 01:33) (18 - 20)  SpO2: 99% (07 Apr 2024 01:33) (98% - 100%)    Parameters below as of 07 Apr 2024 01:33  Patient On (Oxygen Delivery Method): room air  O2 Flow (L/min): 19 04-05-24 @ 07:01  -  04-06-24 @ 07:00  --------------------------------------------------------  IN: 0 mL / OUT: 1450 mL / NET: -1450 mL    04-06-24 @ 07:01  -  04-07-24 @ 03:42  --------------------------------------------------------  IN: 370 mL / OUT: 400 mL / NET: -30 mL        Physical Exam:  Gen: NAD  L Hand:  foul smelling 5x 5 cm wound with epidermal sloughing over dorsum of hand  Small 2 cm longitudinal incision in dorsum of hand with packing   No palpable collections, no drainage   no pain with passive extension of  fingers ,  Motor: Flexion/extension/abduction/adduction of all fingers/thumb intact but limited ROM by pain   Sensory: SILT throughout L hand and forearm    +Rad pulse,     LABS:                        10.4   8.11  )-----------( 523      ( 06 Apr 2024 06:54 )             31.2     04-06    137  |  100  |  13  ----------------------------<  109<H>  4.3   |  23  |  1.03    Ca    9.8      06 Apr 2024 06:54  Phos  5.1     04-06  Mg     2.20     04-06    TPro  3.4<L>  /  Alb  1.7<L>  /  TBili  0.4  /  DBili  x   /  AST  6   /  ALT  9   /  AlkPhos  121<H>  04-05  
Plastic Surgery Progress Note (pg [facility]: [id number], [facility]: [phone])    SUBJECTIVE  The patient was seen and examined. No acute events overnight. Underwent skin graft to L hand dorsum yesterday. Pain controlled, afebrile w/ stable vitals.     OBJECTIVE  ___________________________________________________  VITAL SIGNS / I&O's   Vital Signs Last 24 Hrs  T(C): 36.9 (15 Apr 2024 05:09), Max: 37.2 (14 Apr 2024 22:26)  T(F): 98.4 (15 Apr 2024 05:09), Max: 99 (15 Apr 2024 02:02)  HR: 88 (15 Apr 2024 05:09) (80 - 94)  BP: 135/84 (15 Apr 2024 05:09) (111/80 - 135/84)  BP(mean): --  RR: 18 (15 Apr 2024 05:09) (18 - 18)  SpO2: 99% (15 Apr 2024 05:09) (97% - 100%)    Parameters below as of 15 Apr 2024 05:09  Patient On (Oxygen Delivery Method): room air  ___________________________________________________  PHYSICAL EXAM    -- CONSTITUTIONAL: NAD, lying in bed  -- NEURO: Awake, alert  -- HEENT: NC/AT, mucous membranes moist  -- NECK: Soft, no asymmetry  -- PULM: Non-labored respirations, equal chest rise bilaterally  -- ABDOMEN: Nondistended  -- EXTREMITIES: L hand in ace wrap and splint; fingers NVI and intact ROM. Skin graft donor site hemostatic  -- PSYCH: Affect normal, A&Ox3    ___________________________________________________    LABS:  cret                        11.6   7.70  )-----------( 301      ( 14 Apr 2024 03:31 )             37.0     04-14    135  |  97<L>  |  16  ----------------------------<  121<H>  4.2   |  23  |  1.01    Ca    9.6      14 Apr 2024 03:31  Phos  3.9     04-14  Mg     1.90     04-14      Urinalysis Basic - ( 12 Apr 2024 00:41 )    Color: x / Appearance: x / SG: x / pH: x  Gluc: 109 mg/dL / Ketone: x  / Bili: x / Urobili: x   Blood: x / Protein: x / Nitrite: x   Leuk Esterase: x / RBC: x / WBC x   Sq Epi: x / Non Sq Epi: x / Bacteria: x      ___________________________________________________  MICRO  Recent Cultures:  Specimen Source: .Tissue LEFT HAND DORSAL, SKIN CULTURE, 04-11 @ 16:50; Results   Testing in progress; Gram Stain: moderate gram negative rods   No polymorphonuclear leukocytes seen per low power field  Culture: Moderate Citrobacter koseri, Moderate Proteus mirabilis       ___________________________________________________  MEDICATIONS  (STANDING):  apixaban 5 milliGRAM(s) Oral every 12 hours  atorvastatin 20 milliGRAM(s) Oral at bedtime  bacitracin   Ointment 1 Application(s) Topical two times a day  bacitracin   Ointment 1 Application(s) Topical daily  chlorhexidine 2% Cloths 1 Application(s) Topical <User Schedule>  gabapentin 300 milliGRAM(s) Oral every 8 hours  lactobacillus acidophilus 1 Tablet(s) Oral three times a day with meals  lidocaine 1% Injectable 1 Vial(s) Local Injection once  metoprolol tartrate 25 milliGRAM(s) Oral every 8 hours  senna 2 Tablet(s) Oral at bedtime  sodium chloride 0.9%. 1000 milliLiter(s) (100 mL/Hr) IV Continuous <Continuous>  vancomycin  IVPB 1000 milliGRAM(s) IV Intermittent every 8 hours    MEDICATIONS  (PRN):  acetaminophen     Tablet .. 650 milliGRAM(s) Oral every 6 hours PRN Temp greater or equal to 38C (100.4F), Mild Pain (1 - 3)  oxyCODONE    IR 5 milliGRAM(s) Oral every 8 hours PRN Moderate Pain (4 - 6)  oxyCODONE    IR 7.5 milliGRAM(s) Oral every 8 hours PRN Severe Pain (7 - 10)  sodium chloride 0.65% Nasal 1 Spray(s) Both Nostrils two times a day PRN Nasal Congestion  
Plastic Surgery Progress Note (pg [facility]: [id number], [facility]: [phone])    SUBJECTIVE  The patient was seen and examined. No acute events overnight. Underwent skin graft to L hand dorsum yesterday. Pain controlled, afebrile w/ stable vitals.     OBJECTIVE  ___________________________________________________  VITAL SIGNS / I&O's   Vital Signs Last 24 Hrs  T(C): 37.6 (12 Apr 2024 13:28), Max: 37.6 (12 Apr 2024 13:28)  T(F): 99.6 (12 Apr 2024 13:28), Max: 99.6 (12 Apr 2024 13:28)  HR: 92 (12 Apr 2024 13:28) (79 - 96)  BP: 126/91 (12 Apr 2024 13:28) (126/91 - 149/89)  BP(mean): 98 (11 Apr 2024 19:00) (93 - 101)  RR: 18 (12 Apr 2024 13:28) (12 - 18)  SpO2: 100% (12 Apr 2024 13:28) (96% - 100%)    Parameters below as of 12 Apr 2024 13:28  Patient On (Oxygen Delivery Method): room air          11 Apr 2024 07:01  -  12 Apr 2024 07:00  --------------------------------------------------------  IN:  Total IN: 0 mL    OUT:    Incontinent per Condom Catheter (mL): 1350 mL  Total OUT: 1350 mL    Total NET: -1350 mL        ___________________________________________________  PHYSICAL EXAM    -- CONSTITUTIONAL: NAD, lying in bed  -- NEURO: Awake, alert  -- HEENT: NC/AT, mucous membranes moist  -- NECK: Soft, no asymmetry  -- PULM: Non-labored respirations, equal chest rise bilaterally  -- ABDOMEN: Nondistended  -- EXTREMITIES: L hand in ace wrap and splint; fingers NVI and intact ROM. Skin graft donor site hemostatic  -- PSYCH: Affect normal, A&Ox3    ___________________________________________________  LABS                        10.6   7.62  )-----------( 360      ( 12 Apr 2024 00:41 )             33.4     12 Apr 2024 00:41    136    |  99     |  12     ----------------------------<  109    3.9     |  24     |  1.01     Ca    9.6        12 Apr 2024 00:41  Phos  4.8       12 Apr 2024 00:41  Mg     1.80      12 Apr 2024 00:41      PT/INR - ( 11 Apr 2024 06:09 )   PT: 12.6 sec;   INR: 1.13 ratio         PTT - ( 11 Apr 2024 06:09 )  PTT:85.6 sec  CAPILLARY BLOOD GLUCOSE            Urinalysis Basic - ( 12 Apr 2024 00:41 )    Color: x / Appearance: x / SG: x / pH: x  Gluc: 109 mg/dL / Ketone: x  / Bili: x / Urobili: x   Blood: x / Protein: x / Nitrite: x   Leuk Esterase: x / RBC: x / WBC x   Sq Epi: x / Non Sq Epi: x / Bacteria: x      ___________________________________________________  MICRO  Recent Cultures:  Specimen Source: .Tissue LEFT HAND DORSAL, SKIN CULTURE, 04-11 @ 16:50; Results   Testing in progress; Gram Stain:   No polymorphonuclear leukocytes seen per low power field  Few Gram Negative Rods seen per oil power field<!>; Organism: --    ___________________________________________________  MEDICATIONS  (STANDING):  apixaban 5 milliGRAM(s) Oral every 12 hours  atorvastatin 20 milliGRAM(s) Oral at bedtime  bacitracin   Ointment 1 Application(s) Topical two times a day  bacitracin   Ointment 1 Application(s) Topical daily  chlorhexidine 2% Cloths 1 Application(s) Topical <User Schedule>  gabapentin 300 milliGRAM(s) Oral every 8 hours  lactobacillus acidophilus 1 Tablet(s) Oral three times a day with meals  lidocaine 1% Injectable 1 Vial(s) Local Injection once  metoprolol tartrate 25 milliGRAM(s) Oral every 8 hours  senna 2 Tablet(s) Oral at bedtime  sodium chloride 0.9%. 1000 milliLiter(s) (100 mL/Hr) IV Continuous <Continuous>  vancomycin  IVPB 1000 milliGRAM(s) IV Intermittent every 8 hours    MEDICATIONS  (PRN):  acetaminophen     Tablet .. 650 milliGRAM(s) Oral every 6 hours PRN Temp greater or equal to 38C (100.4F), Mild Pain (1 - 3)  oxyCODONE    IR 5 milliGRAM(s) Oral every 8 hours PRN Moderate Pain (4 - 6)  oxyCODONE    IR 7.5 milliGRAM(s) Oral every 8 hours PRN Severe Pain (7 - 10)  sodium chloride 0.65% Nasal 1 Spray(s) Both Nostrils two times a day PRN Nasal Congestion  
s/p Debridement of left dorsal hand and reconstruction with split thickness skin graft    Postop Plan:  - Left hand splint in place - keep clean and dry, keep elevated, ROM fingers ok, NWB left hand, ok for WBAT through elbow  - Donor site left proximal thigh - keep dressing in place, reinforce as needed with ABDs and ACE for any drainage once anticoagulation started  - Ok to start heparin gtt tomorrow AM with NO bolus or can just restart Eliquis tomorrow AM  - Ok to be DC from hand standpoint  - Should follow up in 7-10 days days for wound check  - Recommend d/c with PO abx x 5 7 days  - Cleared for DC from Hand Surgery standpoint    Wayne Valderrama MD  Plastic, Reconstructive, & Hand Surgery  [facility]  [phone]
  Follow Up:  wound    Interval History/ROS:  Overnight: No acute events.  Patient remains afebrile.  Otherwise hemodynamically stable on room air.  Latest labs show no leukocytosis, anemia 10.9/34.8, thrombocytosis 431.  BMP with renal function within normal limits.  Vancomycin trough 15.2 on 4/9/2024.  TTE obtained on 4/8/2024 shows no evidence for infective process.    Patient seen examined at bedside.  No new complaints.  No distress.  Allergies  No Known Allergies        ANTIMICROBIALS:  vancomycin  IVPB 1000 every 8 hours      OTHER MEDS:  MEDICATIONS  (STANDING):  acetaminophen     Tablet .. 650 every 6 hours PRN  gabapentin 300 every 8 hours  heparin   Injectable 9000 every 6 hours PRN  heparin   Injectable 4500 every 6 hours PRN  heparin  Infusion.  <Continuous>  metoprolol tartrate 25 every 8 hours  oxyCODONE    IR 5 every 8 hours PRN  senna 2 at bedtime      Vital Signs Last 24 Hrs  T(C): 36.6 (09 Apr 2024 13:30), Max: 37.6 (09 Apr 2024 00:45)  T(F): 97.9 (09 Apr 2024 13:30), Max: 99.7 (09 Apr 2024 00:45)  HR: 101 (09 Apr 2024 13:30) (86 - 101)  BP: 147/84 (09 Apr 2024 13:30) (126/79 - 147/84)  BP(mean): --  RR: 18 (09 Apr 2024 13:30) (17 - 18)  SpO2: 99% (09 Apr 2024 13:30) (97% - 99%)    Parameters below as of 09 Apr 2024 13:30  Patient On (Oxygen Delivery Method): room air        PHYSICAL EXAM:  GENERAL: NAD, lying in bed comfortably  HEAD: No head lesions  EYES: Conjunctiva pink and cornea white  ENT: Normal external ears and nose, no discharges; moist mucous membranes  NECK: Supple, nontender to palpation; no JVD  CHEST/LUNG: Clear to auscultation bilaterally  HEART: S1 S2  ABDOMEN: Soft, nontender, nondistended; normoactive bowel sounds  EXTREMITIES: No clubbing, cyanosis, or petal edema  NERVOUS SYSTEM: Alert and oriented to person, time, place and situation, speech clear. No focal deficits   MSK: No joint erythema, swelling or pain  SKIN: Lesion on R dorsum of hand, foul smelling, does not appear purulent at this exam                                10.9   7.33  )-----------( 431      ( 09 Apr 2024 05:04 )             34.8       04-09    138  |  101  |  12  ----------------------------<  108<H>  4.2   |  22  |  1.06    Ca    9.7      09 Apr 2024 05:04  Phos  4.9     04-09  Mg     2.10     04-09        Urinalysis Basic - ( 09 Apr 2024 05:04 )    Color: x / Appearance: x / SG: x / pH: x  Gluc: 108 mg/dL / Ketone: x  / Bili: x / Urobili: x   Blood: x / Protein: x / Nitrite: x   Leuk Esterase: x / RBC: x / WBC x   Sq Epi: x / Non Sq Epi: x / Bacteria: x        MICROBIOLOGY:  Vancomycin Level, Trough: 15.2 ug/mL (04-09-24 @ 05:04)  v                  RADIOLOGY:  Imaging reviewed
Follow Up:      Inverval History/ROS:Patient is a 53y old  Male who presents with a chief complaint of left hand wound (13 Apr 2024 20:41)    Appreciated dermatology input  No fever      Allergies    No Known Allergies    Intolerances        ANTIMICROBIALS:  cefepime   IVPB 2000 every 8 hours  cefepime   IVPB        OTHER MEDS:  acetaminophen     Tablet .. 650 milliGRAM(s) Oral every 6 hours PRN  apixaban 5 milliGRAM(s) Oral every 12 hours  atorvastatin 20 milliGRAM(s) Oral at bedtime  bacitracin   Ointment 1 Application(s) Topical two times a day  chlorhexidine 2% Cloths 1 Application(s) Topical <User Schedule>  cholecalciferol 2000 Unit(s) Oral daily  gabapentin 300 milliGRAM(s) Oral every 8 hours  lactobacillus acidophilus 1 Tablet(s) Oral three times a day with meals  lidocaine 1% Injectable 1 Vial(s) Local Injection once  metoprolol tartrate 25 milliGRAM(s) Oral every 8 hours  mupirocin 2% Ointment 1 Application(s) Topical two times a day  oxyCODONE    IR 7.5 milliGRAM(s) Oral every 8 hours PRN  oxyCODONE    IR 5 milliGRAM(s) Oral every 8 hours PRN  senna 2 Tablet(s) Oral at bedtime  sodium chloride 0.65% Nasal 1 Spray(s) Both Nostrils two times a day PRN  sodium chloride 0.9%. 1000 milliLiter(s) IV Continuous <Continuous>      Vital Signs Last 24 Hrs  T(C): 36.5 (14 Apr 2024 09:00), Max: 37.7 (13 Apr 2024 21:10)  T(F): 97.7 (14 Apr 2024 09:00), Max: 99.9 (13 Apr 2024 21:10)  HR: 90 (14 Apr 2024 09:00) (87 - 97)  BP: 125/79 (14 Apr 2024 09:00) (111/83 - 140/87)  BP(mean): --  RR: 18 (14 Apr 2024 09:00) (18 - 18)  SpO2: 100% (14 Apr 2024 09:00) (98% - 100%)    Parameters below as of 14 Apr 2024 09:00  Patient On (Oxygen Delivery Method): room air        PHYSICAL EXAM:  General: [ x] non-toxic  HEAD/EYES: [ ] PERRL [ ] white sclera [ ] icterus  ENT:  [ ] normal [ x] supple [ ] thrush [ ] pharyngeal exudate  Cardiovascular:   [ ] murmur [ x] normal [ ] PPM/AICD  Respiratory:  x[ ] clear to ausculation bilaterally  GI:  [ x] soft, non-tender, normal bowel sounds  :  [ ] best [ ] no CVA tenderness   Musculoskeletal:  [ ] no synovitis  Neurologic:  [ ] non-focal exam   Skin:  [x ] macules ot fingers left hand  Lymph: [x ] no lymphadenopathy  Psychiatric:  [ ] appropriate affect [ ] alert & oriented  Lines:  [x ] no phlebitis [ ] central line                                11.6   7.70  )-----------( 301      ( 14 Apr 2024 03:31 )             37.0       04-14    135  |  97<L>  |  16  ----------------------------<  121<H>  4.2   |  23  |  1.01    Ca    9.6      14 Apr 2024 03:31  Phos  3.9     04-14  Mg     1.90     04-14        Urinalysis Basic - ( 14 Apr 2024 03:31 )    Color: x / Appearance: x / SG: x / pH: x  Gluc: 121 mg/dL / Ketone: x  / Bili: x / Urobili: x   Blood: x / Protein: x / Nitrite: x   Leuk Esterase: x / RBC: x / WBC x   Sq Epi: x / Non Sq Epi: x / Bacteria: x        MICROBIOLOGY:Culture Results:   Moderate Citrobacter koseri  Moderate Proteus mirabilis (04-11-24 @ 16:50)  Culture Results:   Culture is being performed. Fungal cultures are held for 4 weeks. (04-11-24 @ 16:50)      RADIOLOGY:    
Plastic Surgery Progress Note (pg [facility]: [id number], [facility]: [phone])    SUBJECTIVE  The patient was seen and examined. No acute events overnight. Pain controlled, afebrile w/ stable vitals.     OBJECTIVE  ___________________________________________________  VITAL SIGNS / I&O's   Vital Signs Last 24 Hrs  T(C): 37.1 (10 Apr 2024 05:50), Max: 37.2 (09 Apr 2024 09:50)  T(F): 98.8 (10 Apr 2024 05:50), Max: 99 (09 Apr 2024 09:50)  HR: 80 (10 Apr 2024 05:50) (80 - 101)  BP: 132/80 (10 Apr 2024 05:50) (132/80 - 147/84)  BP(mean): --  RR: 17 (10 Apr 2024 05:50) (17 - 18)  SpO2: 98% (10 Apr 2024 05:50) (98% - 100%)    Parameters below as of 10 Apr 2024 05:50  Patient On (Oxygen Delivery Method): room air          09 Apr 2024 07:01  -  10 Apr 2024 07:00  --------------------------------------------------------  IN:    Heparin Infusion: 180 mL    IV PiggyBack: 250 mL    Oral Fluid: 860 mL  Total IN: 1290 mL    OUT:    Incontinent per Condom Catheter (mL): 2350 mL    Voided (mL): 0 mL  Total OUT: 2350 mL    Total NET: -1060 mL        ___________________________________________________  PHYSICAL EXAM    -- CONSTITUTIONAL: NAD, lying in bed  -- NEURO: Awake, alert  -- L Hand:  L Hand: 5x 5 cm wound with epidermal sloughing over dorsum of hand                  Small 2 cm longitudinal incision in dorsum of hand with packing                   No palpable collections or active drainage                   No pain with passive extension of fingers                  Motor: Flexion/extension/abduction/adduction of all fingers/thumb intact but limited ROM by pain                   Sensory: SILT throughout L hand and forearm    ___________________________________________________  LABS                        10.5   6.99  )-----------( 408      ( 10 Apr 2024 04:55 )             33.2     10 Apr 2024 04:55    138    |  99     |  12     ----------------------------<  100    4.6     |  21     |  0.97     Ca    9.8        10 Apr 2024 04:55  Phos  5.1       10 Apr 2024 04:55  Mg     1.80      10 Apr 2024 04:55      PTT - ( 10 Apr 2024 04:55 )  PTT:42.2 sec  CAPILLARY BLOOD GLUCOSE            Urinalysis Basic - ( 10 Apr 2024 04:55 )    Color: x / Appearance: x / SG: x / pH: x  Gluc: 100 mg/dL / Ketone: x  / Bili: x / Urobili: x   Blood: x / Protein: x / Nitrite: x   Leuk Esterase: x / RBC: x / WBC x   Sq Epi: x / Non Sq Epi: x / Bacteria: x      ___________________________________________________  MICRO  Recent Cultures:  Specimen Source: .Blood Blood-Peripheral, 04-04 @ 13:23; Results   No growth at 5 days; Gram Stain: --; Organism: --  Specimen Source: .Blood Blood-Peripheral, 04-04 @ 13:10; Results   No growth at 5 days; Gram Stain: --; Organism: --    ___________________________________________________  MEDICATIONS  (STANDING):  bacitracin   Ointment 1 Application(s) Topical daily  bacitracin   Ointment 1 Application(s) Topical two times a day  chlorhexidine 2% Cloths 1 Application(s) Topical <User Schedule>  gabapentin 300 milliGRAM(s) Oral every 8 hours  heparin  Infusion.  Unit(s)/Hr (20 mL/Hr) IV Continuous <Continuous>  lactobacillus acidophilus 1 Tablet(s) Oral three times a day with meals  lidocaine 1% Injectable 1 Vial(s) Local Injection once  metoprolol tartrate 25 milliGRAM(s) Oral every 8 hours  senna 2 Tablet(s) Oral at bedtime  sodium chloride 0.9%. 1000 milliLiter(s) (100 mL/Hr) IV Continuous <Continuous>  vancomycin  IVPB 1000 milliGRAM(s) IV Intermittent every 8 hours    MEDICATIONS  (PRN):  acetaminophen     Tablet .. 650 milliGRAM(s) Oral every 6 hours PRN Temp greater or equal to 38C (100.4F), Mild Pain (1 - 3)  heparin   Injectable 9000 Unit(s) IV Push every 6 hours PRN For aPTT less than 40  heparin   Injectable 4500 Unit(s) IV Push every 6 hours PRN For aPTT between 40 - 57  oxyCODONE    IR 5 milliGRAM(s) Oral every 8 hours PRN Severe Pain (7 - 10)  sodium chloride 0.65% Nasal 1 Spray(s) Both Nostrils two times a day PRN Nasal Congestion  
Follow Up:      Inverval History/ROS:Patient is a 53y old  Male who presents with a chief complaint of left hand wound (12 Apr 2024 15:36)    No fever      Allergies    No Known Allergies    Intolerances        ANTIMICROBIALS:  cefepime   IVPB 2000 every 8 hours  cefepime   IVPB        OTHER MEDS:  acetaminophen     Tablet .. 650 milliGRAM(s) Oral every 6 hours PRN  apixaban 5 milliGRAM(s) Oral every 12 hours  atorvastatin 20 milliGRAM(s) Oral at bedtime  bacitracin   Ointment 1 Application(s) Topical two times a day  bacitracin   Ointment 1 Application(s) Topical daily  chlorhexidine 2% Cloths 1 Application(s) Topical <User Schedule>  cholecalciferol 2000 Unit(s) Oral daily  gabapentin 300 milliGRAM(s) Oral every 8 hours  lactobacillus acidophilus 1 Tablet(s) Oral three times a day with meals  lidocaine 1% Injectable 1 Vial(s) Local Injection once  metoprolol tartrate 25 milliGRAM(s) Oral every 8 hours  oxyCODONE    IR 5 milliGRAM(s) Oral every 8 hours PRN  oxyCODONE    IR 7.5 milliGRAM(s) Oral every 8 hours PRN  senna 2 Tablet(s) Oral at bedtime  sodium chloride 0.65% Nasal 1 Spray(s) Both Nostrils two times a day PRN  sodium chloride 0.9%. 1000 milliLiter(s) IV Continuous <Continuous>      Vital Signs Last 24 Hrs  T(C): 37.2 (13 Apr 2024 10:00), Max: 37.8 (12 Apr 2024 21:00)  T(F): 99 (13 Apr 2024 10:00), Max: 100 (12 Apr 2024 21:00)  HR: 89 (13 Apr 2024 10:00) (89 - 99)  BP: 129/87 (13 Apr 2024 10:00) (120/75 - 132/90)  BP(mean): --  RR: 18 (13 Apr 2024 10:00) (18 - 18)  SpO2: 99% (13 Apr 2024 10:00) (96% - 100%)    Parameters below as of 13 Apr 2024 10:00  Patient On (Oxygen Delivery Method): room air        PHYSICAL EXAM:  General: [x ] non-toxic  HEAD/EYES: [ ] PERRL x[ ] white sclera [ ] icterus  ENT:  [ ] normal [ x] supple [ ] thrush [ ] pharyngeal exudate  Cardiovascular:   [ ] murmur [ x] normal [ ] PPM/AICD  Respiratory:  [x ] clear to ausculation bilaterally  GI:  [x ] soft, non-tender, normal bowel sounds  :  [ ] best [ ] no CVA tenderness   Musculoskeletal:  [ ] no synovitis  Neurologic:  [ ] non-focal exam   Skin:  [x ] dressing to left wrists; left hand has nontender black lesions to multiple finger tips  Lymph: [ ] no lymphadenopathy  Psychiatric:  [ ] appropriate affect [ ] alert & oriented  Lines:  [ x] no phlebitis [ ] central line                                10.9   7.65  )-----------( 305      ( 13 Apr 2024 05:00 )             33.0       04-13    134<L>  |  97<L>  |  14  ----------------------------<  95  4.4   |  23  |  1.02    Ca    9.6      13 Apr 2024 05:00  Phos  4.8     04-13  Mg     1.80     04-13        Urinalysis Basic - ( 13 Apr 2024 05:00 )    Color: x / Appearance: x / SG: x / pH: x  Gluc: 95 mg/dL / Ketone: x  / Bili: x / Urobili: x   Blood: x / Protein: x / Nitrite: x   Leuk Esterase: x / RBC: x / WBC x   Sq Epi: x / Non Sq Epi: x / Bacteria: x        MICROBIOLOGY:Culture Results:   Moderate Gram Negative Rods (04-11-24 @ 16:50)  Culture Results:   Testing in progress (04-11-24 @ 16:50)      RADIOLOGY:    
Name of Patient : TIMI GUERRERO  MRN: [id number]        Subjective: Patient seen and examined. No new events except as noted.   Doing okay    REVIEW OF SYSTEMS:    CONSTITUTIONAL: No weakness, fevers or chills  EYES/ENT: No visual changes;  No vertigo or throat pain   NECK: No pain or stiffness  RESPIRATORY: No cough, wheezing, hemoptysis; No shortness of breath  CARDIOVASCULAR: No chest pain or palpitations  GASTROINTESTINAL: No abdominal or epigastric pain. No nausea, vomiting, or hematemesis; No diarrhea or constipation. No melena or hematochezia.  GENITOURINARY: No dysuria, frequency or hematuria  NEUROLOGICAL: No numbness or weakness  SKIN: No itching, burning, rashes, or lesions   All other review of systems is negative unless indicated above.    MEDICATIONS:  MEDICATIONS  (STANDING):  bacitracin   Ointment 1 Application(s) Topical daily  bacitracin   Ointment 1 Application(s) Topical two times a day  chlorhexidine 2% Cloths 1 Application(s) Topical <User Schedule>  gabapentin 300 milliGRAM(s) Oral every 8 hours  lactobacillus acidophilus 1 Tablet(s) Oral three times a day with meals  lidocaine 1% Injectable 1 Vial(s) Local Injection once  metoprolol tartrate 25 milliGRAM(s) Oral every 8 hours  senna 2 Tablet(s) Oral at bedtime  sodium chloride 0.9%. 1000 milliLiter(s) (100 mL/Hr) IV Continuous <Continuous>  vancomycin  IVPB 1000 milliGRAM(s) IV Intermittent every 8 hours      PHYSICAL EXAM:  T(C): 36.4 (04-11-24 @ 22:01), Max: 37.2 (04-11-24 @ 12:50)  HR: 93 (04-11-24 @ 22:01) (79 - 93)  BP: 137/83 (04-11-24 @ 22:01) (120/86 - 148/84)  RR: 17 (04-11-24 @ 22:01) (12 - 18)  SpO2: 99% (04-11-24 @ 22:01) (96% - 100%)  Wt(kg): --  I&O's Summary    10 Apr 2024 07:01  -  11 Apr 2024 07:00  --------------------------------------------------------  IN: 0 mL / OUT: 600 mL / NET: -600 mL    11 Apr 2024 07:01  -  12 Apr 2024 00:21  --------------------------------------------------------  IN: 0 mL / OUT: 350 mL / NET: -350 mL      Height (cm): 182.9 (04-11 @ 12:21)  Weight (kg): 113 (04-11 @ 12:21)  BMI (kg/m2): 33.8 (04-11 @ 12:21)  BSA (m2): 2.34 (04-11 @ 12:21)    Appearance: Normal	  HEENT:  PERRLA   Lymphatic: No lymphadenopathy   Cardiovascular: Normal S1 S2, no JVD  Respiratory: normal effort , clear  Gastrointestinal:  Soft, Non-tender  Skin: No rashes,  warm to touch  Psychiatry:  Mood & affect appropriate  Musculuskeletal: No edema    recent labs, Imaging and EKGs personally reviewed     04-10-24 @ 07:01  -  04-11-24 @ 07:00  --------------------------------------------------------  IN: 0 mL / OUT: 600 mL / NET: -600 mL    04-11-24 @ 07:01  -  04-12-24 @ 00:21  --------------------------------------------------------  IN: 0 mL / OUT: 350 mL / NET: -350 mL                          10.9   7.49  )-----------( 407      ( 11 Apr 2024 06:09 )             34.9               04-11    139  |  101  |  9   ----------------------------<  101<H>  4.0   |  24  |  0.96    Ca    10.1      11 Apr 2024 06:09  Phos  5.1     04-11  Mg     2.00     04-11      PT/INR - ( 11 Apr 2024 06:09 )   PT: 12.6 sec;   INR: 1.13 ratio         PTT - ( 11 Apr 2024 06:09 )  PTT:85.6 sec                   Urinalysis Basic - ( 11 Apr 2024 06:09 )    Color: x / Appearance: x / SG: x / pH: x  Gluc: 101 mg/dL / Ketone: x  / Bili: x / Urobili: x   Blood: x / Protein: x / Nitrite: x   Leuk Esterase: x / RBC: x / WBC x   Sq Epi: x / Non Sq Epi: x / Bacteria: x              
ORTHOPAEDIC PROGRESS NOTE    SUBJECTIVE:  Pt seen and examined at bedside this am.  Doing well.  No acute events overnight.  Pt states pain is well controlled    OBJECTIVE:  Vital Signs Last 24 Hrs  T(C): 37 (06 Apr 2024 02:03), Max: 37.9 (05 Apr 2024 22:01)  T(F): 98.6 (06 Apr 2024 02:03), Max: 100.3 (05 Apr 2024 22:01)  HR: 101 (06 Apr 2024 02:03) (91 - 109)  BP: 144/88 (06 Apr 2024 02:03) (128/78 - 144/88)  BP(mean): --  RR: 18 (06 Apr 2024 02:03) (18 - 19)  SpO2: 96% (06 Apr 2024 02:03) (96% - 100%)    Parameters below as of 06 Apr 2024 02:03  Patient On (Oxygen Delivery Method): room air        Physical Exam:  Gen: Sitting in bed, NAD  Resp: No increased WOB  L Hand:  foul smelling 5x 5 cm wound with epidermal sloughing over dorsum of hand  Small 2 cm longitudinal incision in dorsum of hand with packing   No palpable collections, no drainage   no pain with passive extension of  fingers ,  Motor: Flexion/extension/abduction/adduction of all fingers/thumb intact but limited ROM by pain   Sensory: SILT throughout L hand and forearm    +Rad pulse,   LABS                        10.1   8.17  )-----------( 530      ( 05 Apr 2024 08:05 )             31.9       04-05    139  |  100  |  13  ----------------------------<  107<H>  4.6   |  25  |  1.13    Ca    9.2      05 Apr 2024 08:05  Phos  4.9     04-05  Mg     2.10     04-05    TPro  3.4<L>  /  Alb  1.7<L>  /  TBili  0.4  /  DBili  x   /  AST  6   /  ALT  9   /  AlkPhos  121<H>  04-05      PT/INR - ( 04 Apr 2024 13:49 )   PT: 13.9 sec;   INR: 1.24 ratio         PTT - ( 04 Apr 2024 13:49 )  PTT:30.2 sec    I&O's Summary    05 Apr 2024 07:01  -  06 Apr 2024 06:55  --------------------------------------------------------  IN: 0 mL / OUT: 800 mL / NET: -800 mL          
  Follow Up:  wound    Interval History/ROS:  Overnight: No acute events.  Patient remains afebrile.  Otherwise hemodynamically stable on room air.  Latest labs show no leukocytosis, anemia 11.2/33.7, BMP with renal function within normal limits, tissue culture obtained in the OR is growing Citrobacter and Proteus.    Patient seen examined at bedside.  Complaining of right foot pain.  Denies any other new pain.    Allergies  No Known Allergies        ANTIMICROBIALS:  cefepime   IVPB 2000 every 8 hours  cefepime   IVPB        OTHER MEDS:  MEDICATIONS  (STANDING):  acetaminophen     Tablet .. 650 every 6 hours PRN  apixaban 5 every 12 hours  atorvastatin 20 at bedtime  gabapentin 300 every 8 hours  metoprolol tartrate 25 every 8 hours  oxyCODONE    IR 7.5 every 8 hours PRN  oxyCODONE    IR 5 every 8 hours PRN  senna 2 at bedtime      Vital Signs Last 24 Hrs  T(C): 36.9 (15 Apr 2024 11:46), Max: 37.2 (14 Apr 2024 22:26)  T(F): 98.4 (15 Apr 2024 11:46), Max: 99 (15 Apr 2024 02:02)  HR: 67 (15 Apr 2024 11:46) (67 - 94)  BP: 146/88 (15 Apr 2024 11:46) (111/80 - 146/88)  BP(mean): --  RR: 18 (15 Apr 2024 11:46) (18 - 18)  SpO2: 95% (15 Apr 2024 11:46) (95% - 100%)    Parameters below as of 15 Apr 2024 11:46  Patient On (Oxygen Delivery Method): room air        PHYSICAL EXAM:  General: [ x] non-toxic  HEAD/EYES: [ ] PERRL [ ] white sclera [ ] icterus  ENT:  [ ] normal [ x] supple [ ] thrush [ ] pharyngeal exudate  Cardiovascular:   [ ] murmur [ x] normal [ ] PPM/AICD  Respiratory:  x[ ] clear to ausculation bilaterally  GI:  [ x] soft, non-tender, normal bowel sounds  :  [ ] best [ ] no CVA tenderness   Musculoskeletal:  [ ] no synovitis  Neurologic:  [ ] non-focal exam   Skin:  [x ] macules ot fingers left hand  Lymph: [x ] no lymphadenopathy  Psychiatric:  [ ] appropriate affect [ ] alert & oriented  Lines:  [x ] no phlebitis [ ] central line                                  11.2   7.23  )-----------( 308      ( 15 Apr 2024 06:40 )             33.7       04-15    137  |  99  |  15  ----------------------------<  104<H>  4.3   |  22  |  1.00    Ca    9.6      15 Apr 2024 06:40  Phos  4.7     04-15  Mg     1.80     04-15        Urinalysis Basic - ( 15 Apr 2024 06:40 )    Color: x / Appearance: x / SG: x / pH: x  Gluc: 104 mg/dL / Ketone: x  / Bili: x / Urobili: x   Blood: x / Protein: x / Nitrite: x   Leuk Esterase: x / RBC: x / WBC x   Sq Epi: x / Non Sq Epi: x / Bacteria: x        MICROBIOLOGY:  v    Culture - Fungal, Tissue (collected 11 Apr 2024 16:50)  Source: .Tissue LEFT HAND DORSAL, SKIN CULTURE  Preliminary Report (13 Apr 2024 23:03):    Culture is being performed. Fungal cultures are held for 4 weeks.    Culture - Tissue with Gram Stain (collected 11 Apr 2024 16:50)  Source: .Tissue LEFT HAND DORSAL, SKIN CULTURE  Gram Stain (12 Apr 2024 02:00):    No polymorphonuclear leukocytes seen per low power field    Few Gram Negative Rods seen per oil power field  Preliminary Report (13 Apr 2024 22:32):    Moderate Citrobacter koseri    Moderate Proteus mirabilis  Organism: Citrobacter koseri  Proteus mirabilis (14 Apr 2024 22:49)  Organism: Proteus mirabilis (14 Apr 2024 22:49)      Method Type: ARIANA      -  Amoxicillin/Clavulanic Acid: S <=8/4      -  Ampicillin: S <=8 These ampicillin results predict results for amoxicillin      -  Ampicillin/Sulbactam: S <=4/2      -  Aztreonam: S <=4      -  Cefazolin: S <=2      -  Cefepime: S <=2      -  Cefoxitin: S <=8      -  Ceftriaxone: S <=1      -  Ciprofloxacin: S <=0.25      -  Ertapenem: S <=0.5      -  Gentamicin: S <=2      -  Levofloxacin: S <=0.5      -  Meropenem: S <=1      -  Piperacillin/Tazobactam: S <=8      -  Tobramycin: S <=2      -  Trimethoprim/Sulfamethoxazole: S <=0.5/9.5  Organism: Citrobacter koseri (13 Apr 2024 20:23)      Method Type: ARIANA      -  Amoxicillin/Clavulanic Acid: S <=8/4      -  Ampicillin: R >16 These ampicillin results predict results for amoxicillin      -  Ampicillin/Sulbactam: S <=4/2      -  Aztreonam: S <=4      -  Cefazolin: S <=2      -  Cefepime: S <=2      -  Cefoxitin: S <=8      -  Ceftriaxone: S <=1      -  Ciprofloxacin: S <=0.25      -  Ertapenem: S <=0.5      -  Gentamicin: S <=2      -  Imipenem: S <=1      -  Levofloxacin: S <=0.5      -  Meropenem: S <=1      -  Piperacillin/Tazobactam: S <=8      -  Tobramycin: S <=2      -  Trimethoprim/Sulfamethoxazole: S <=0.5/9.5                    RADIOLOGY:  Imaging reviewed
  Follow Up:  wound infection    Interval History/ROS: s/p debridement of the wound by plastics yesterday and now growing GNR, no fever, vomiting or cough          Allergies  No Known Allergies        ANTIMICROBIALS:  cefepime   IVPB        OTHER MEDS:  acetaminophen     Tablet .. 650 milliGRAM(s) Oral every 6 hours PRN  apixaban 5 milliGRAM(s) Oral every 12 hours  atorvastatin 20 milliGRAM(s) Oral at bedtime  bacitracin   Ointment 1 Application(s) Topical two times a day  bacitracin   Ointment 1 Application(s) Topical daily  chlorhexidine 2% Cloths 1 Application(s) Topical <User Schedule>  gabapentin 300 milliGRAM(s) Oral every 8 hours  lactobacillus acidophilus 1 Tablet(s) Oral three times a day with meals  lidocaine 1% Injectable 1 Vial(s) Local Injection once  metoprolol tartrate 25 milliGRAM(s) Oral every 8 hours  oxyCODONE    IR 5 milliGRAM(s) Oral every 8 hours PRN  oxyCODONE    IR 7.5 milliGRAM(s) Oral every 8 hours PRN  senna 2 Tablet(s) Oral at bedtime  sodium chloride 0.65% Nasal 1 Spray(s) Both Nostrils two times a day PRN  sodium chloride 0.9%. 1000 milliLiter(s) IV Continuous <Continuous>      Vital Signs Last 24 Hrs  T(C): 37.6 (12 Apr 2024 13:28), Max: 37.6 (12 Apr 2024 13:28)  T(F): 99.6 (12 Apr 2024 13:28), Max: 99.6 (12 Apr 2024 13:28)  HR: 92 (12 Apr 2024 13:28) (79 - 96)  BP: 126/91 (12 Apr 2024 13:28) (126/91 - 149/89)  BP(mean): 98 (11 Apr 2024 19:00) (93 - 101)  RR: 18 (12 Apr 2024 13:28) (12 - 18)  SpO2: 100% (12 Apr 2024 13:28) (96% - 100%)    Parameters below as of 12 Apr 2024 13:28  Patient On (Oxygen Delivery Method): room air        Physical Exam:  General:    NAD,  non toxic  Respiratory:    comfortable on RA  abd:     soft,     no tenderness  :   no CVAT,  no suprapubic tenderness,   no  best (external cath)  Musculoskeletal: L hand s/p debridement and with dressing  vascular: no phlebitis  Skin:    no rash                          10.6   7.62  )-----------( 360      ( 12 Apr 2024 00:41 )             33.4       04-12    136  |  99  |  12  ----------------------------<  109<H>  3.9   |  24  |  1.01    Ca    9.6      12 Apr 2024 00:41  Phos  4.8     04-12  Mg     1.80     04-12        Urinalysis Basic - ( 12 Apr 2024 00:41 )    Color: x / Appearance: x / SG: x / pH: x  Gluc: 109 mg/dL / Ketone: x  / Bili: x / Urobili: x   Blood: x / Protein: x / Nitrite: x   Leuk Esterase: x / RBC: x / WBC x   Sq Epi: x / Non Sq Epi: x / Bacteria: x        MICROBIOLOGY:  Vancomycin Level, Trough: 13.5 ug/mL (04-12-24 @ 00:41)  v  .Tissue LEFT HAND DORSAL, SKIN CULTURE  04-11-24   Testing in progress  --    No polymorphonuclear leukocytes seen per low power field  Few Gram Negative Rods seen per oil power field      .Blood Blood-Peripheral  04-04-24   No growth at 5 days  --  --      .Blood Blood-Peripheral  04-04-24   No growth at 5 days  --  --                RADIOLOGY:  Images independently visualized and reviewed personally, findings as below  < from: CT Shoulder No Cont, Right (04.05.24 @ 17:53) >  IMPRESSION:    Comminuted, displaced fracture of the scapular wing.    Mildly displaced comminuted fractures of the partially visualized second   through seventh ribs and associated small to moderate partially   visualized right hemothorax.      < end of copied text >  < from: Xray Wrist 3 Views, Left (04.05.24 @ 17:39) >    IMPRESSION:    No acute fracture.    < end of copied text >  
Name of Patient : TIMI GUERRERO  MRN: [id number]  Date of visit: 04-10-24       Subjective: Patient seen and examined. No new events except as noted.   Doing okay     REVIEW OF SYSTEMS:    CONSTITUTIONAL: No weakness, fevers or chills  EYES/ENT: No visual changes;  No vertigo or throat pain   NECK: No pain or stiffness  RESPIRATORY: No cough, wheezing, hemoptysis; No shortness of breath  CARDIOVASCULAR: No chest pain or palpitations  GASTROINTESTINAL: No abdominal or epigastric pain. No nausea, vomiting, or hematemesis; No diarrhea or constipation. No melena or hematochezia.  GENITOURINARY: No dysuria, frequency or hematuria  NEUROLOGICAL: No numbness or weakness  SKIN: No itching, burning, rashes, or lesions   All other review of systems is negative unless indicated above.    MEDICATIONS:  MEDICATIONS  (STANDING):  bacitracin   Ointment 1 Application(s) Topical daily  bacitracin   Ointment 1 Application(s) Topical two times a day  chlorhexidine 2% Cloths 1 Application(s) Topical <User Schedule>  gabapentin 300 milliGRAM(s) Oral every 8 hours  heparin  Infusion.  Unit(s)/Hr (20 mL/Hr) IV Continuous <Continuous>  lactobacillus acidophilus 1 Tablet(s) Oral three times a day with meals  lidocaine 1% Injectable 1 Vial(s) Local Injection once  metoprolol tartrate 25 milliGRAM(s) Oral every 8 hours  senna 2 Tablet(s) Oral at bedtime  sodium chloride 0.9%. 1000 milliLiter(s) (100 mL/Hr) IV Continuous <Continuous>  vancomycin  IVPB 1000 milliGRAM(s) IV Intermittent every 8 hours      PHYSICAL EXAM:  T(C): 37.1 (04-10-24 @ 17:26), Max: 37.2 (04-10-24 @ 14:00)  HR: 81 (04-10-24 @ 17:26) (80 - 90)  BP: 130/84 (04-10-24 @ 17:26) (130/84 - 142/96)  RR: 18 (04-10-24 @ 17:26) (17 - 18)  SpO2: 98% (04-10-24 @ 17:26) (96% - 99%)  Wt(kg): --  I&O's Summary    09 Apr 2024 07:01  -  10 Apr 2024 07:00  --------------------------------------------------------  IN: 1290 mL / OUT: 2350 mL / NET: -1060 mL          Appearance: Normal	  HEENT:  PERRLA   Lymphatic: No lymphadenopathy   Cardiovascular: Normal S1 S2, no JVD  Respiratory: normal effort , clear  Gastrointestinal:  Soft, Non-tender  Skin: No rashes,  warm to touch  Psychiatry:  Mood & affect appropriate  Musculuskeletal: No edema    recent labs, Imaging and EKGs personally reviewed             04-09-24 @ 07:01  -  04-10-24 @ 07:00  --------------------------------------------------------  IN: 1290 mL / OUT: 2350 mL / NET: -1060 mL            
Name of Patient : TIMI GUERRERO  MRN: [id number]  Date of visit: 04-13-24 @ 17:00      Subjective: Patient seen and examined. No new events except as noted.     REVIEW OF SYSTEMS:    CONSTITUTIONAL: No weakness, fevers or chills  EYES/ENT: No visual changes;  No vertigo or throat pain   NECK: No pain or stiffness  RESPIRATORY: No cough, wheezing, hemoptysis; No shortness of breath  CARDIOVASCULAR: No chest pain or palpitations  GASTROINTESTINAL: No abdominal or epigastric pain. No nausea, vomiting, or hematemesis; No diarrhea or constipation. No melena or hematochezia.  GENITOURINARY: No dysuria, frequency or hematuria  NEUROLOGICAL: No numbness or weakness  SKIN: No itching, burning, rashes, or lesions   All other review of systems is negative unless indicated above.    MEDICATIONS:  MEDICATIONS  (STANDING):  apixaban 5 milliGRAM(s) Oral every 12 hours  atorvastatin 20 milliGRAM(s) Oral at bedtime  bacitracin   Ointment 1 Application(s) Topical two times a day  bacitracin   Ointment 1 Application(s) Topical daily  cefepime   IVPB      cefepime   IVPB 2000 milliGRAM(s) IV Intermittent every 8 hours  chlorhexidine 2% Cloths 1 Application(s) Topical <User Schedule>  cholecalciferol 2000 Unit(s) Oral daily  gabapentin 300 milliGRAM(s) Oral every 8 hours  lactobacillus acidophilus 1 Tablet(s) Oral three times a day with meals  lidocaine 1% Injectable 1 Vial(s) Local Injection once  metoprolol tartrate 25 milliGRAM(s) Oral every 8 hours  senna 2 Tablet(s) Oral at bedtime  sodium chloride 0.9%. 1000 milliLiter(s) (100 mL/Hr) IV Continuous <Continuous>      PHYSICAL EXAM:  T(C): 37 (04-13-24 @ 15:24), Max: 37.8 (04-12-24 @ 21:00)  HR: 97 (04-13-24 @ 15:24) (88 - 99)  BP: 118/79 (04-13-24 @ 15:24) (118/79 - 136/88)  RR: 18 (04-13-24 @ 15:24) (18 - 18)  SpO2: 98% (04-13-24 @ 15:24) (96% - 100%)  Wt(kg): --  I&O's Summary    12 Apr 2024 07:01  -  13 Apr 2024 07:00  --------------------------------------------------------  IN: 1150 mL / OUT: 420 mL / NET: 730 mL          Appearance: Normal	  HEENT:  PERRLA   Lymphatic: No lymphadenopathy   Cardiovascular: Normal S1 S2, no JVD  Respiratory: normal effort , clear  Gastrointestinal:  Soft, Non-tender  Skin: No rashes,  warm to touch  Psychiatry:  Mood & affect appropriate  Musculuskeletal: No edema    recent labs, Imaging and EKGs personally reviewed     04-12-24 @ 07:01  -  04-13-24 @ 07:00  --------------------------------------------------------  IN: 1150 mL / OUT: 420 mL / NET: 730 mL                          10.9   7.65  )-----------( 305      ( 13 Apr 2024 05:00 )             33.0               04-13    134<L>  |  97<L>  |  14  ----------------------------<  95  4.4   |  23  |  1.02    Ca    9.6      13 Apr 2024 05:00  Phos  4.8     04-13  Mg     1.80     04-13                         Urinalysis Basic - ( 13 Apr 2024 05:00 )    Color: x / Appearance: x / SG: x / pH: x  Gluc: 95 mg/dL / Ketone: x  / Bili: x / Urobili: x   Blood: x / Protein: x / Nitrite: x   Leuk Esterase: x / RBC: x / WBC x   Sq Epi: x / Non Sq Epi: x / Bacteria: x              
Name of Patient : TIMI GUERRERO  MRN: [id number]  Date of visit: 04-15-24 @ 10:37      Subjective: Patient seen and examined. No new events except as noted.   Doing okay    REVIEW OF SYSTEMS:    CONSTITUTIONAL: No weakness, fevers or chills  EYES/ENT: No visual changes;  No vertigo or throat pain   NECK: No pain or stiffness  RESPIRATORY: No cough, wheezing, hemoptysis; No shortness of breath  CARDIOVASCULAR: No chest pain or palpitations  GASTROINTESTINAL: No abdominal or epigastric pain. No nausea, vomiting, or hematemesis; No diarrhea or constipation. No melena or hematochezia.  GENITOURINARY: No dysuria, frequency or hematuria  NEUROLOGICAL: No numbness or weakness  SKIN: No itching, burning, rashes, or lesions   All other review of systems is negative unless indicated above.    MEDICATIONS:  MEDICATIONS  (STANDING):  apixaban 5 milliGRAM(s) Oral every 12 hours  atorvastatin 20 milliGRAM(s) Oral at bedtime  bacitracin   Ointment 1 Application(s) Topical two times a day  cefepime   IVPB      cefepime   IVPB 2000 milliGRAM(s) IV Intermittent every 8 hours  chlorhexidine 2% Cloths 1 Application(s) Topical <User Schedule>  cholecalciferol 2000 Unit(s) Oral daily  gabapentin 300 milliGRAM(s) Oral every 8 hours  lactobacillus acidophilus 1 Tablet(s) Oral three times a day with meals  lidocaine 1% Injectable 1 Vial(s) Local Injection once  metoprolol tartrate 25 milliGRAM(s) Oral every 8 hours  mupirocin 2% Ointment 1 Application(s) Topical two times a day  senna 2 Tablet(s) Oral at bedtime      PHYSICAL EXAM:  T(C): 36.9 (04-15-24 @ 05:09), Max: 37.2 (04-14-24 @ 22:26)  HR: 88 (04-15-24 @ 05:09) (80 - 94)  BP: 135/84 (04-15-24 @ 05:09) (111/80 - 135/84)  RR: 18 (04-15-24 @ 05:09) (18 - 18)  SpO2: 99% (04-15-24 @ 05:09) (97% - 100%)  Wt(kg): --  I&O's Summary    14 Apr 2024 07:01  -  15 Apr 2024 07:00  --------------------------------------------------------  IN: 1060 mL / OUT: 1400 mL / NET: -340 mL          Appearance: Normal	  HEENT:  PERRLA   Lymphatic: No lymphadenopathy   Cardiovascular: Normal S1 S2, no JVD  Respiratory: normal effort , clear  Gastrointestinal:  Soft, Non-tender  Skin: No rashes,  warm to touch  Psychiatry:  Mood & affect appropriate  Musculuskeletal: LUE cast. intact ROM     recent labs, Imaging and EKGs personally reviewed     04-14-24 @ 07:01  -  04-15-24 @ 07:00  --------------------------------------------------------  IN: 1060 mL / OUT: 1400 mL / NET: -340 mL                          11.2   7.23  )-----------( 308      ( 15 Apr 2024 06:40 )             33.7               04-15    137  |  99  |  15  ----------------------------<  104<H>  4.3   |  22  |  1.00    Ca    9.6      15 Apr 2024 06:40  Phos  4.7     04-15  Mg     1.80     04-15                         Urinalysis Basic - ( 15 Apr 2024 06:40 )    Color: x / Appearance: x / SG: x / pH: x  Gluc: 104 mg/dL / Ketone: x  / Bili: x / Urobili: x   Blood: x / Protein: x / Nitrite: x   Leuk Esterase: x / RBC: x / WBC x   Sq Epi: x / Non Sq Epi: x / Bacteria: x              
Name of Patient : TIMI GUERRERO  MRN: [id number]  Date of visit: 04-05-24       Subjective: Patient seen and examined. No new events except as noted.   Doing okay  LUE swelling and skin wound     REVIEW OF SYSTEMS:    CONSTITUTIONAL: No weakness, fevers or chills  EYES/ENT: No visual changes;  No vertigo or throat pain   NECK: No pain or stiffness  RESPIRATORY: No cough, wheezing, hemoptysis; No shortness of breath  CARDIOVASCULAR: No chest pain or palpitations  GASTROINTESTINAL: No abdominal or epigastric pain. No nausea, vomiting, or hematemesis; No diarrhea or constipation. No melena or hematochezia.  GENITOURINARY: No dysuria, frequency or hematuria  NEUROLOGICAL: No numbness or weakness  SKIN: No itching, burning, rashes, or lesions   All other review of systems is negative unless indicated above.    MEDICATIONS:  MEDICATIONS  (STANDING):  apixaban 5 milliGRAM(s) Oral every 12 hours  bacitracin   Ointment 1 Application(s) Topical two times a day  chlorhexidine 2% Cloths 1 Application(s) Topical <User Schedule>  clindamycin IVPB 900 milliGRAM(s) IV Intermittent every 8 hours  gabapentin 300 milliGRAM(s) Oral every 8 hours  sodium chloride 0.9%. 1000 milliLiter(s) (100 mL/Hr) IV Continuous <Continuous>      PHYSICAL EXAM:  T(C): 36.9 (04-05-24 @ 14:25), Max: 37.3 (04-05-24 @ 01:54)  HR: 92 (04-05-24 @ 14:25) (80 - 100)  BP: 128/78 (04-05-24 @ 14:25) (128/78 - 151/87)  RR: 19 (04-05-24 @ 14:25) (17 - 20)  SpO2: 99% (04-05-24 @ 14:25) (95% - 100%)  Wt(kg): --  I&O's Summary    Height (cm): 182.9 (04-05 @ 05:30)  Weight (kg): 113 (04-05 @ 05:30)  BMI (kg/m2): 33.8 (04-05 @ 05:30)  BSA (m2): 2.34 (04-05 @ 05:30)    Appearance: Normal	  HEENT:  PERRLA   Lymphatic: No lymphadenopathy   Cardiovascular: Normal S1 S2, no JVD  Respiratory: normal effort , clear  Gastrointestinal:  Soft, Non-tender  Skin: No rashes,  warm to touch  Psychiatry:  Mood & affect appropriate  Musculuskeletal: No edema    recent labs, Imaging and EKGs personally reviewed                           10.1   8.17  )-----------( 530      ( 05 Apr 2024 08:05 )             31.9               04-05    139  |  100  |  13  ----------------------------<  107<H>  4.6   |  25  |  1.13    Ca    9.2      05 Apr 2024 08:05  Phos  4.9     04-05  Mg     2.10     04-05    TPro  3.4<L>  /  Alb  1.7<L>  /  TBili  0.4  /  DBili  x   /  AST  6   /  ALT  9   /  AlkPhos  121<H>  04-05    PT/INR - ( 04 Apr 2024 13:49 )   PT: 13.9 sec;   INR: 1.24 ratio         PTT - ( 04 Apr 2024 13:49 )  PTT:30.2 sec                   Urinalysis Basic - ( 05 Apr 2024 08:05 )    Color: x / Appearance: x / SG: x / pH: x  Gluc: 107 mg/dL / Ketone: x  / Bili: x / Urobili: x   Blood: x / Protein: x / Nitrite: x   Leuk Esterase: x / RBC: x / WBC x   Sq Epi: x / Non Sq Epi: x / Bacteria: x                    
Name of Patient : TIMI GUERRERO  MRN: [id number]  Date of visit: 04-09-24       Subjective: Patient seen and examined. No new events except as noted.     REVIEW OF SYSTEMS:    CONSTITUTIONAL: No weakness, fevers or chills  EYES/ENT: No visual changes;  No vertigo or throat pain   NECK: No pain or stiffness  RESPIRATORY: No cough, wheezing, hemoptysis; No shortness of breath  CARDIOVASCULAR: No chest pain or palpitations  GASTROINTESTINAL: No abdominal or epigastric pain. No nausea, vomiting, or hematemesis; No diarrhea or constipation. No melena or hematochezia.  GENITOURINARY: No dysuria, frequency or hematuria  NEUROLOGICAL: No numbness or weakness  SKIN: No itching, burning, rashes, or lesions   All other review of systems is negative unless indicated above.    MEDICATIONS:  MEDICATIONS  (STANDING):  bacitracin   Ointment 1 Application(s) Topical daily  bacitracin   Ointment 1 Application(s) Topical two times a day  chlorhexidine 2% Cloths 1 Application(s) Topical <User Schedule>  gabapentin 300 milliGRAM(s) Oral every 8 hours  heparin  Infusion.  Unit(s)/Hr (20 mL/Hr) IV Continuous <Continuous>  lactobacillus acidophilus 1 Tablet(s) Oral three times a day with meals  lidocaine 1% Injectable 1 Vial(s) Local Injection once  metoprolol tartrate 25 milliGRAM(s) Oral every 8 hours  senna 2 Tablet(s) Oral at bedtime  sodium chloride 0.9%. 1000 milliLiter(s) (100 mL/Hr) IV Continuous <Continuous>  vancomycin  IVPB 1000 milliGRAM(s) IV Intermittent every 8 hours      PHYSICAL EXAM:  T(C): 37 (04-09-24 @ 19:39), Max: 37.6 (04-09-24 @ 00:45)  HR: 93 (04-09-24 @ 19:39) (87 - 101)  BP: 137/83 (04-09-24 @ 19:39) (126/79 - 147/84)  RR: 18 (04-09-24 @ 19:39) (18 - 18)  SpO2: 100% (04-09-24 @ 19:39) (97% - 100%)  Wt(kg): --  I&O's Summary    08 Apr 2024 07:01  -  09 Apr 2024 07:00  --------------------------------------------------------  IN: 1170 mL / OUT: 1500 mL / NET: -330 mL    09 Apr 2024 07:01  -  09 Apr 2024 23:31  --------------------------------------------------------  IN: 1090 mL / OUT: 750 mL / NET: 340 mL          Appearance: Normal	  HEENT:  PERRLA   Lymphatic: No lymphadenopathy   Cardiovascular: Normal S1 S2, no JVD  Respiratory: normal effort , clear  Gastrointestinal:  Soft, Non-tender  Skin: No rashes,  warm to touch  Psychiatry:  Mood & affect appropriate  Musculuskeletal: No edema    recent labs, Imaging and EKGs personally reviewed     04-08-24 @ 07:01  -  04-09-24 @ 07:00  --------------------------------------------------------  IN: 1170 mL / OUT: 1500 mL / NET: -330 mL    04-09-24 @ 07:01  -  04-09-24 @ 23:31  --------------------------------------------------------  IN: 1090 mL / OUT: 750 mL / NET: 340 mL                          10.9   7.33  )-----------( 431      ( 09 Apr 2024 05:04 )             34.8               04-09    138  |  101  |  12  ----------------------------<  108<H>  4.2   |  22  |  1.06    Ca    9.7      09 Apr 2024 05:04  Phos  4.9     04-09  Mg     2.10     04-09      PTT - ( 09 Apr 2024 05:04 )  PTT:62.2 sec                   Urinalysis Basic - ( 09 Apr 2024 05:04 )    Color: x / Appearance: x / SG: x / pH: x  Gluc: 108 mg/dL / Ketone: x  / Bili: x / Urobili: x   Blood: x / Protein: x / Nitrite: x   Leuk Esterase: x / RBC: x / WBC x   Sq Epi: x / Non Sq Epi: x / Bacteria: x              
Name of Patient : TIMI GUERRERO  MRN: [id number]  Date of visit: 04-07-24      Subjective: Patient seen and examined. No new events except as noted.   doing okay     REVIEW OF SYSTEMS:    CONSTITUTIONAL: No weakness, fevers or chills  EYES/ENT: No visual changes;  No vertigo or throat pain   NECK: No pain or stiffness  RESPIRATORY: No cough, wheezing, hemoptysis; No shortness of breath  CARDIOVASCULAR: No chest pain or palpitations  GASTROINTESTINAL: No abdominal or epigastric pain. No nausea, vomiting, or hematemesis; No diarrhea or constipation. No melena or hematochezia.  GENITOURINARY: No dysuria, frequency or hematuria  NEUROLOGICAL: No numbness or weakness  SKIN: No itching, burning, rashes, or lesions   All other review of systems is negative unless indicated above.    MEDICATIONS:  MEDICATIONS  (STANDING):  bacitracin   Ointment 1 Application(s) Topical daily  bacitracin   Ointment 1 Application(s) Topical two times a day  chlorhexidine 2% Cloths 1 Application(s) Topical <User Schedule>  gabapentin 300 milliGRAM(s) Oral every 8 hours  heparin  Infusion.  Unit(s)/Hr (20 mL/Hr) IV Continuous <Continuous>  lactobacillus acidophilus 1 Tablet(s) Oral three times a day with meals  lidocaine 1% Injectable 1 Vial(s) Local Injection once  metoprolol tartrate 25 milliGRAM(s) Oral every 8 hours  senna 2 Tablet(s) Oral at bedtime  sodium chloride 0.9%. 1000 milliLiter(s) (100 mL/Hr) IV Continuous <Continuous>  vancomycin  IVPB 1250 milliGRAM(s) IV Intermittent every 12 hours      PHYSICAL EXAM:  T(C): 37.2 (04-07-24 @ 18:37), Max: 37.2 (04-07-24 @ 05:49)  HR: 88 (04-07-24 @ 21:51) (81 - 95)  BP: 125/81 (04-07-24 @ 21:51) (123/75 - 142/93)  RR: 18 (04-07-24 @ 18:37) (18 - 18)  SpO2: 99% (04-07-24 @ 18:37) (98% - 99%)  Wt(kg): --  I&O's Summary    06 Apr 2024 07:01  -  07 Apr 2024 07:00  --------------------------------------------------------  IN: 490 mL / OUT: 1000 mL / NET: -510 mL    07 Apr 2024 07:01  -  07 Apr 2024 22:15  --------------------------------------------------------  IN: 1170 mL / OUT: 1100 mL / NET: 70 mL          Appearance: Normal	  HEENT:  PERRLA   Lymphatic: No lymphadenopathy   Cardiovascular: Normal S1 S2, no JVD  Respiratory: normal effort , clear  Gastrointestinal:  Soft, Non-tender  Skin: No rashes,  warm to touch  Psychiatry:  Mood & affect appropriate  Musculuskeletal: CATRACHITOE dressing  recent labs, Imaging and EKGs personally reviewed     04-06-24 @ 07:01  -  04-07-24 @ 07:00  --------------------------------------------------------  IN: 490 mL / OUT: 1000 mL / NET: -510 mL    04-07-24 @ 07:01  -  04-07-24 @ 22:15  --------------------------------------------------------  IN: 1170 mL / OUT: 1100 mL / NET: 70 mL                          10.5   6.81  )-----------( 468      ( 07 Apr 2024 05:40 )             31.3               04-07    137  |  101  |  14  ----------------------------<  105<H>  4.3   |  24  |  1.03    Ca    9.4      07 Apr 2024 05:40  Phos  4.7     04-07  Mg     2.30     04-07      PT/INR - ( 07 Apr 2024 15:49 )   PT: 13.6 sec;   INR: 1.22 ratio         PTT - ( 07 Apr 2024 21:40 )  PTT:59.3 sec                   Urinalysis Basic - ( 07 Apr 2024 05:40 )    Color: x / Appearance: x / SG: x / pH: x  Gluc: 105 mg/dL / Ketone: x  / Bili: x / Urobili: x   Blood: x / Protein: x / Nitrite: x   Leuk Esterase: x / RBC: x / WBC x   Sq Epi: x / Non Sq Epi: x / Bacteria: x              
Name of Patient : TIMI GUERRERO  MRN: [id number]  Date of visit: 04-14-24       Subjective: Patient seen and examined. No new events except as noted.   doing okay    REVIEW OF SYSTEMS:    CONSTITUTIONAL: No weakness, fevers or chills  EYES/ENT: No visual changes;  No vertigo or throat pain   NECK: No pain or stiffness  RESPIRATORY: No cough, wheezing, hemoptysis; No shortness of breath  CARDIOVASCULAR: No chest pain or palpitations  GASTROINTESTINAL: No abdominal or epigastric pain. No nausea, vomiting, or hematemesis; No diarrhea or constipation. No melena or hematochezia.  GENITOURINARY: No dysuria, frequency or hematuria  NEUROLOGICAL: No numbness or weakness  SKIN: No itching, burning, rashes, or lesions   All other review of systems is negative unless indicated above.    MEDICATIONS:  MEDICATIONS  (STANDING):  apixaban 5 milliGRAM(s) Oral every 12 hours  atorvastatin 20 milliGRAM(s) Oral at bedtime  bacitracin   Ointment 1 Application(s) Topical two times a day  cefepime   IVPB      cefepime   IVPB 2000 milliGRAM(s) IV Intermittent every 8 hours  chlorhexidine 2% Cloths 1 Application(s) Topical <User Schedule>  cholecalciferol 2000 Unit(s) Oral daily  gabapentin 300 milliGRAM(s) Oral every 8 hours  lactobacillus acidophilus 1 Tablet(s) Oral three times a day with meals  lidocaine 1% Injectable 1 Vial(s) Local Injection once  metoprolol tartrate 25 milliGRAM(s) Oral every 8 hours  mupirocin 2% Ointment 1 Application(s) Topical two times a day  senna 2 Tablet(s) Oral at bedtime  sodium chloride 0.9%. 1000 milliLiter(s) (100 mL/Hr) IV Continuous <Continuous>      PHYSICAL EXAM:  T(C): 36.9 (04-14-24 @ 16:40), Max: 37.7 (04-13-24 @ 21:10)  HR: 80 (04-14-24 @ 16:40) (80 - 97)  BP: 127/85 (04-14-24 @ 16:40) (118/78 - 140/87)  RR: 18 (04-14-24 @ 16:40) (18 - 18)  SpO2: 97% (04-14-24 @ 16:40) (97% - 100%)  Wt(kg): --  I&O's Summary    14 Apr 2024 07:01  -  14 Apr 2024 21:00  --------------------------------------------------------  IN: 480 mL / OUT: 500 mL / NET: -20 mL          Appearance: Normal	  HEENT:  PERRLA   Lymphatic: No lymphadenopathy   Cardiovascular: Normal S1 S2, no JVD  Respiratory: normal effort , clear  Gastrointestinal:  Soft, Non-tender  Skin: No rashes,  warm to touch  Psychiatry:  Mood & affect appropriate  Musculuskeletal: No edema    recent labs, Imaging and EKGs personally reviewed     04-14-24 @ 07:01  -  04-14-24 @ 21:00  --------------------------------------------------------  IN: 480 mL / OUT: 500 mL / NET: -20 mL                            11.6   7.70  )-----------( 301      ( 14 Apr 2024 03:31 )             37.0               04-14    135  |  97<L>  |  16  ----------------------------<  121<H>  4.2   |  23  |  1.01    Ca    9.6      14 Apr 2024 03:31  Phos  3.9     04-14  Mg     1.90     04-14                         Urinalysis Basic - ( 14 Apr 2024 03:31 )    Color: x / Appearance: x / SG: x / pH: x  Gluc: 121 mg/dL / Ketone: x  / Bili: x / Urobili: x   Blood: x / Protein: x / Nitrite: x   Leuk Esterase: x / RBC: x / WBC x   Sq Epi: x / Non Sq Epi: x / Bacteria: x            
Name of Patient : TIMI GUERRERO  MRN: [id number]  Date of visit: 04-08-24       Subjective: Patient seen and examined. No new events except as noted.     REVIEW OF SYSTEMS:    CONSTITUTIONAL: No weakness, fevers or chills  EYES/ENT: No visual changes;  No vertigo or throat pain   NECK: No pain or stiffness  RESPIRATORY: No cough, wheezing, hemoptysis; No shortness of breath  CARDIOVASCULAR: No chest pain or palpitations  GASTROINTESTINAL: No abdominal or epigastric pain. No nausea, vomiting, or hematemesis; No diarrhea or constipation. No melena or hematochezia.  GENITOURINARY: No dysuria, frequency or hematuria  NEUROLOGICAL: No numbness or weakness  SKIN: No itching, burning, rashes, or lesions   All other review of systems is negative unless indicated above.    MEDICATIONS:  MEDICATIONS  (STANDING):  bacitracin   Ointment 1 Application(s) Topical daily  bacitracin   Ointment 1 Application(s) Topical two times a day  chlorhexidine 2% Cloths 1 Application(s) Topical <User Schedule>  gabapentin 300 milliGRAM(s) Oral every 8 hours  heparin  Infusion.  Unit(s)/Hr (20 mL/Hr) IV Continuous <Continuous>  lactobacillus acidophilus 1 Tablet(s) Oral three times a day with meals  lidocaine 1% Injectable 1 Vial(s) Local Injection once  metoprolol tartrate 25 milliGRAM(s) Oral every 8 hours  senna 2 Tablet(s) Oral at bedtime  sodium chloride 0.9%. 1000 milliLiter(s) (100 mL/Hr) IV Continuous <Continuous>  vancomycin  IVPB 1000 milliGRAM(s) IV Intermittent every 8 hours      PHYSICAL EXAM:  T(C): 37.3 (04-08-24 @ 20:44), Max: 37.4 (04-08-24 @ 13:36)  HR: 96 (04-08-24 @ 21:47) (85 - 101)  BP: 141/86 (04-08-24 @ 20:44) (124/89 - 147/92)  RR: 18 (04-08-24 @ 20:44) (17 - 18)  SpO2: 98% (04-08-24 @ 20:44) (98% - 100%)  Wt(kg): --  I&O's Summary    07 Apr 2024 07:01  -  08 Apr 2024 07:00  --------------------------------------------------------  IN: 1950 mL / OUT: 1900 mL / NET: 50 mL    08 Apr 2024 07:01  -  08 Apr 2024 21:56  --------------------------------------------------------  IN: 1170 mL / OUT: 1500 mL / NET: -330 mL          Appearance: Normal	  HEENT:  PERRLA   Lymphatic: No lymphadenopathy   Cardiovascular: Normal S1 S2, no JVD  Respiratory: normal effort , clear  Gastrointestinal:  Soft, Non-tender  Skin: No rashes,  warm to touch  Psychiatry:  Mood & affect appropriate  Musculuskeletal: No edema    recent labs, Imaging and EKGs personally reviewed     04-07-24 @ 07:01  -  04-08-24 @ 07:00  --------------------------------------------------------  IN: 1950 mL / OUT: 1900 mL / NET: 50 mL    04-08-24 @ 07:01  -  04-08-24 @ 21:56  --------------------------------------------------------  IN: 1170 mL / OUT: 1500 mL / NET: -330 mL                            10.5   7.97  )-----------( 470      ( 08 Apr 2024 05:43 )             33.1               04-08    137  |  100  |  14  ----------------------------<  113<H>  4.3   |  22  |  0.97    Ca    10.0      08 Apr 2024 05:43  Phos  5.0     04-08  Mg     2.10     04-08      PT/INR - ( 07 Apr 2024 15:49 )   PT: 13.6 sec;   INR: 1.22 ratio         PTT - ( 08 Apr 2024 05:43 )  PTT:84.1 sec                   Urinalysis Basic - ( 08 Apr 2024 05:43 )    Color: x / Appearance: x / SG: x / pH: x  Gluc: 113 mg/dL / Ketone: x  / Bili: x / Urobili: x   Blood: x / Protein: x / Nitrite: x   Leuk Esterase: x / RBC: x / WBC x   Sq Epi: x / Non Sq Epi: x / Bacteria: x            
Name of Patient : TIMI GUERRERO  MRN: [id number]  Date of visit: 04-12-24       Subjective: Patient seen and examined. No new events except as noted.   Doing okay  S/P OR, pending Cx results  Abx switched to zosyn    REVIEW OF SYSTEMS:    CONSTITUTIONAL: No weakness, fevers or chills  EYES/ENT: No visual changes;  No vertigo or throat pain   NECK: No pain or stiffness  RESPIRATORY: No cough, wheezing, hemoptysis; No shortness of breath  CARDIOVASCULAR: No chest pain or palpitations  GASTROINTESTINAL: No abdominal or epigastric pain. No nausea, vomiting, or hematemesis; No diarrhea or constipation. No melena or hematochezia.  GENITOURINARY: No dysuria, frequency or hematuria  NEUROLOGICAL: No numbness or weakness  SKIN: No itching, burning, rashes, or lesions   All other review of systems is negative unless indicated above.    MEDICATIONS:  MEDICATIONS  (STANDING):  apixaban 5 milliGRAM(s) Oral every 12 hours  atorvastatin 20 milliGRAM(s) Oral at bedtime  bacitracin   Ointment 1 Application(s) Topical daily  bacitracin   Ointment 1 Application(s) Topical two times a day  cefepime   IVPB 2000 milliGRAM(s) IV Intermittent every 8 hours  cefepime   IVPB      chlorhexidine 2% Cloths 1 Application(s) Topical <User Schedule>  gabapentin 300 milliGRAM(s) Oral every 8 hours  lactobacillus acidophilus 1 Tablet(s) Oral three times a day with meals  lidocaine 1% Injectable 1 Vial(s) Local Injection once  metoprolol tartrate 25 milliGRAM(s) Oral every 8 hours  senna 2 Tablet(s) Oral at bedtime  sodium chloride 0.9%. 1000 milliLiter(s) (100 mL/Hr) IV Continuous <Continuous>      PHYSICAL EXAM:  T(C): 37.8 (04-12-24 @ 21:00), Max: 37.8 (04-12-24 @ 21:00)  HR: 96 (04-12-24 @ 21:00) (90 - 96)  BP: 128/80 (04-12-24 @ 21:00) (120/86 - 149/89)  RR: 18 (04-12-24 @ 21:00) (18 - 18)  SpO2: 100% (04-12-24 @ 21:00) (99% - 100%)  Wt(kg): --  I&O's Summary    11 Apr 2024 07:01  -  12 Apr 2024 07:00  --------------------------------------------------------  IN: 0 mL / OUT: 1350 mL / NET: -1350 mL    12 Apr 2024 07:01  -  12 Apr 2024 22:32  --------------------------------------------------------  IN: 1150 mL / OUT: 420 mL / NET: 730 mL          Appearance: Normal	  HEENT:  PERRLA   Lymphatic: No lymphadenopathy   Cardiovascular: Normal S1 S2, no JVD  Respiratory: normal effort , clear  Gastrointestinal:  Soft, Non-tender  Skin: No rashes,  warm to touch  Psychiatry:  Mood & affect appropriate  Musculuskeletal: L UE cast    recent labs, Imaging and EKGs personally reviewed     04-11-24 @ 07:01  -  04-12-24 @ 07:00  --------------------------------------------------------  IN: 0 mL / OUT: 1350 mL / NET: -1350 mL    04-12-24 @ 07:01  -  04-12-24 @ 22:32  --------------------------------------------------------  IN: 1150 mL / OUT: 420 mL / NET: 730 mL                        10.6   7.62  )-----------( 360      ( 12 Apr 2024 00:41 )             33.4               04-12    136  |  99  |  12  ----------------------------<  109<H>  3.9   |  24  |  1.01    Ca    9.6      12 Apr 2024 00:41  Phos  4.8     04-12  Mg     1.80     04-12      PT/INR - ( 11 Apr 2024 06:09 )   PT: 12.6 sec;   INR: 1.13 ratio         PTT - ( 11 Apr 2024 06:09 )  PTT:85.6 sec                   Urinalysis Basic - ( 12 Apr 2024 00:41 )    Color: x / Appearance: x / SG: x / pH: x  Gluc: 109 mg/dL / Ketone: x  / Bili: x / Urobili: x   Blood: x / Protein: x / Nitrite: x   Leuk Esterase: x / RBC: x / WBC x   Sq Epi: x / Non Sq Epi: x / Bacteria: x                
Name of Patient : TIMI GUERRERO  MRN: [id number]  Date of visit: 04-06-24      Subjective: Patient seen and examined. No new events except as noted.   Doing okay     REVIEW OF SYSTEMS:    CONSTITUTIONAL: No weakness, fevers or chills  EYES/ENT: No visual changes;  No vertigo or throat pain   NECK: No pain or stiffness  RESPIRATORY: No cough, wheezing, hemoptysis; No shortness of breath  CARDIOVASCULAR: No chest pain or palpitations  GASTROINTESTINAL: No abdominal or epigastric pain. No nausea, vomiting, or hematemesis; No diarrhea or constipation. No melena or hematochezia.  GENITOURINARY: No dysuria, frequency or hematuria  NEUROLOGICAL: No numbness or weakness  SKIN: No itching, burning, rashes, or lesions   All other review of systems is negative unless indicated above.    MEDICATIONS:  MEDICATIONS  (STANDING):  apixaban 5 milliGRAM(s) Oral every 12 hours  bacitracin   Ointment 1 Application(s) Topical daily  bacitracin   Ointment 1 Application(s) Topical two times a day  chlorhexidine 2% Cloths 1 Application(s) Topical <User Schedule>  gabapentin 300 milliGRAM(s) Oral every 8 hours  lactobacillus acidophilus 1 Tablet(s) Oral three times a day with meals  lidocaine 1% Injectable 1 Vial(s) Local Injection once  metoprolol tartrate 25 milliGRAM(s) Oral every 8 hours  senna 2 Tablet(s) Oral at bedtime  sodium chloride 0.9%. 1000 milliLiter(s) (100 mL/Hr) IV Continuous <Continuous>  vancomycin  IVPB 1250 milliGRAM(s) IV Intermittent every 12 hours      PHYSICAL EXAM:  T(C): 36.9 (04-06-24 @ 18:02), Max: 37.9 (04-05-24 @ 22:01)  HR: 99 (04-06-24 @ 18:02) (84 - 109)  BP: 121/65 (04-06-24 @ 18:02) (121/65 - 144/88)  RR: 18 (04-06-24 @ 18:02) (18 - 18)  SpO2: 100% (04-06-24 @ 18:02) (96% - 100%)  Wt(kg): --  I&O's Summary    05 Apr 2024 07:01  -  06 Apr 2024 07:00  --------------------------------------------------------  IN: 0 mL / OUT: 1450 mL / NET: -1450 mL    06 Apr 2024 07:01  -  06 Apr 2024 21:11  --------------------------------------------------------  IN: 0 mL / OUT: 400 mL / NET: -400 mL          Appearance: Normal	  HEENT:  PERRLA   Lymphatic: No lymphadenopathy   Cardiovascular: Normal S1 S2, no JVD  Respiratory: normal effort , clear  Gastrointestinal:  Soft, Non-tender  Skin: No rashes,  warm to touch  Psychiatry:  Mood & affect appropriate  Musculuskeletal:LUE dressing,swelling    recent labs, Imaging and EKGs personally reviewed     04-05-24 @ 07:01  -  04-06-24 @ 07:00  --------------------------------------------------------  IN: 0 mL / OUT: 1450 mL / NET: -1450 mL    04-06-24 @ 07:01  -  04-06-24 @ 21:11  --------------------------------------------------------  IN: 0 mL / OUT: 400 mL / NET: -400 mL                            10.4   8.11  )-----------( 523      ( 06 Apr 2024 06:54 )             31.2               04-06    137  |  100  |  13  ----------------------------<  109<H>  4.3   |  23  |  1.03    Ca    9.8      06 Apr 2024 06:54  Phos  5.1     04-06  Mg     2.20     04-06    TPro  3.4<L>  /  Alb  1.7<L>  /  TBili  0.4  /  DBili  x   /  AST  6   /  ALT  9   /  AlkPhos  121<H>  04-05                       Urinalysis Basic - ( 06 Apr 2024 06:54 )    Color: x / Appearance: x / SG: x / pH: x  Gluc: 109 mg/dL / Ketone: x  / Bili: x / Urobili: x   Blood: x / Protein: x / Nitrite: x   Leuk Esterase: x / RBC: x / WBC x   Sq Epi: x / Non Sq Epi: x / Bacteria: x
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
## 2024-04-15 NOTE — DISCHARGE NOTE NURSING/CASE MANAGEMENT/SOCIAL WORK - PATIENT PORTAL LINK FT
You can access the FollowMyHealth Patient Portal offered by [facility] by registering at the following website: [email or web address]. By joining [facility]’s FollowMyHealth portal, you will also be able to view your health information using other applications (apps) compatible with our system.

## 2024-04-15 NOTE — PROGRESS NOTE ADULT - REASON FOR ADMISSION
left hand wound
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
## 2024-04-15 NOTE — PROGRESS NOTE ADULT - PROVIDER SPECIALTY LIST ADULT
Infectious Disease
Infectious Disease
Internal Medicine
Infectious Disease
Infectious Disease
Plastic Surgery
Plastic Surgery
Infectious Disease
Orthopedics
Internal Medicine
Internal Medicine
Orthopedics
Plastic Surgery
Plastic Surgery
Internal Medicine

## 2024-04-15 NOTE — PROGRESS NOTE ADULT - PROBLEM SELECTOR PROBLEM 4
Medication management

## 2024-04-15 NOTE — PROGRESS NOTE ADULT - PROBLEM SELECTOR PLAN 3
-c/w eliquis 5 mg bid  - chronic
-c/w eliquis 5 mg bid, on hold, switch to heparin   - chronic
-c/w eliquis 5 mg bid, on hold, switch to heparin   - chronic
-c/w eliquis 5 mg bid  - chronic
-c/w eliquis 5 mg bid, on hold, switch to heparin   - chronic
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
## 2024-04-15 NOTE — PROGRESS NOTE ADULT - PROBLEM SELECTOR PLAN 1
-clinda switched to vanco   -wound care  -pain control  -Wound care  - ID eval called , appreciated recs   - Plastic surg team , appreciated, plan for OR   - vascular eval, R/O compartment synd  - monitor and routine check  - Echo noted   - patient is medically optimized for OR
-clinda switched to vanco   -wound care  -pain control  -Wound care  - ID eval called , appreciated recs   - Plastic surg team , appreciated, plan for OR   - vascular eval, R/O compartment synd  - monitor and routine check  - Echo noted   - patient is medically optimized for OR
-clinda switched to vanco   -wound care  -pain control  -Wound care  - ID eval called , appreciated recs   - Plastic surg team , appreciated, plan for OR   - vascular eval, R/O compartment synd  - monitor and routine check  - Echo noted   - patient is medically optimized for OR  - plan for thursday
-clinda switched to vanco   -wound care  -pain control  -Wound care  - ID eval called , appreciated recs   - Plastic surg team , appreciated, plan for OR   - vascular eval, R/O compartment synd  - monitor and routine check  - check echo for preop
-wound care  -pain control  -Wound care  - ID eval called , appreciated recs   - Plastic surg team   - monitor and routine check  - Echo noted   - S/P OR, cast  - follow up Cx results, Abx per ID. Cx back awaiting ID recs for D/C   - skin lesions, derm andrey called, appreciated
-wound care  -pain control  -Wound care  - ID eval called , appreciated recs   - Plastic surg team   - monitor and routine check  - Echo noted   - S/P OR, cast  - follow up Cx results, Abx per ID  - skin lesions, derm eval called
-clinda switched to vanco   -wound care  -pain control  -Wound care  - ID eval called , appreciated recs   - Plastic surg team , appreciated, plan for OR   - vascular eval, R/O compartment synd  - monitor and routine check  - Echo noted   - patient is medically optimized for OR
-wound care  -pain control  -Wound care  - ID eval called , appreciated recs   - Plastic surg team   - monitor and routine check  - Echo noted   - S/P OR, cast  - follow up Cx results, Abx per ID  - skin lesions, derm eval called, appreciated
-clinda switched to vanco   -wound care  -pain control  -Wound care  - ID eval called , appreciated recs   - Plastic surg team , appreciated, plan for OR   - vascular eval, R/O compartment synd  - monitor and routine check  - check echo for preop
-c/w clindamycin  -wound care  -pain control  -Wound care  - ID eval called   - Plastic surg team   - vascular eval, R/O compartment synd  - monitor and routine check
-clinda switched to vanco   -wound care  -pain control  -Wound care  - ID eval called , appreciated recs   - Plastic surg team   - vascular eval, R/O compartment synd  - monitor and routine check  - Echo noted   - S/P OR, cast  - follow up Cx results, Abx switched to zosyn

## 2024-04-15 NOTE — DISCHARGE NOTE NURSING/CASE MANAGEMENT/SOCIAL WORK - NSDCFUADDAPPT_GEN_ALL_CORE_FT
Wayne Valderrama MD  Plastic, Reconstructive, & Hand Surgery  [facility]  [phone]  7-10 days for wound check

## 2024-04-16 ENCOUNTER — NON-APPOINTMENT (OUTPATIENT)
Age: 54
End: 2024-04-16

## 2024-04-16 PROBLEM — I26.99 OTHER PULMONARY EMBOLISM WITHOUT ACUTE COR PULMONALE: Chronic | Status: ACTIVE | Noted: 2024-04-04

## 2024-04-16 LAB
CULTURE RESULTS: ABNORMAL
ORGANISM # SPEC MICROSCOPIC CNT: ABNORMAL
SPECIMEN SOURCE: SIGNIFICANT CHANGE UP

## 2024-04-16 RX ORDER — OXYCODONE HYDROCHLORIDE 5 MG/1
1 TABLET ORAL
Qty: 0 | Refills: 0 | DISCHARGE

## 2024-04-16 RX ORDER — OXYCODONE HYDROCHLORIDE 5 MG/1
1 TABLET ORAL
Qty: 20 | Refills: 0
Start: 2024-04-16 | End: 2024-04-20
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
## 2024-05-06 ENCOUNTER — INPATIENT (INPATIENT)
Facility: HOSPITAL | Age: 54
LOS: 4 days | Discharge: HOME | End: 2024-05-11
Attending: INTERNAL MEDICINE | Admitting: INTERNAL MEDICINE
Payer: MEDICAID

## 2024-05-06 ENCOUNTER — APPOINTMENT (OUTPATIENT)
Dept: DERMATOLOGY | Facility: CLINIC | Age: 54
End: 2024-05-06

## 2024-05-06 VITALS
HEIGHT: 72 IN | HEART RATE: 111 BPM | DIASTOLIC BLOOD PRESSURE: 93 MMHG | TEMPERATURE: 99 F | SYSTOLIC BLOOD PRESSURE: 166 MMHG | OXYGEN SATURATION: 99 % | RESPIRATION RATE: 18 BRPM

## 2024-05-06 DIAGNOSIS — R29.898 OTHER SYMPTOMS AND SIGNS INVOLVING THE MUSCULOSKELETAL SYSTEM: Chronic | ICD-10-CM

## 2024-05-06 DIAGNOSIS — T86.821 SKIN GRAFT (ALLOGRAFT) (AUTOGRAFT) FAILURE: ICD-10-CM
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
## 2024-05-06 LAB
ALBUMIN SERPL ELPH-MCNC: 3.6 G/DL — SIGNIFICANT CHANGE UP (ref 3.3–5)
ALP SERPL-CCNC: 142 U/L — HIGH (ref 40–120)
ALT FLD-CCNC: 22 U/L — SIGNIFICANT CHANGE UP (ref 4–41)
ANION GAP SERPL CALC-SCNC: 16 MMOL/L — HIGH (ref 7–14)
APTT BLD: 31.3 SEC — SIGNIFICANT CHANGE UP (ref 24.5–35.6)
AST SERPL-CCNC: 43 U/L — HIGH (ref 4–40)
BASOPHILS # BLD AUTO: 0.02 K/UL — SIGNIFICANT CHANGE UP (ref 0–0.2)
BASOPHILS NFR BLD AUTO: 0.3 % — SIGNIFICANT CHANGE UP (ref 0–2)
BILIRUB SERPL-MCNC: 0.6 MG/DL — SIGNIFICANT CHANGE UP (ref 0.2–1.2)
BUN SERPL-MCNC: 8 MG/DL — SIGNIFICANT CHANGE UP (ref 7–23)
CALCIUM SERPL-MCNC: 9 MG/DL — SIGNIFICANT CHANGE UP (ref 8.4–10.5)
CHLORIDE SERPL-SCNC: 100 MMOL/L — SIGNIFICANT CHANGE UP (ref 98–107)
CO2 SERPL-SCNC: 17 MMOL/L — LOW (ref 22–31)
CREAT SERPL-MCNC: 0.68 MG/DL — SIGNIFICANT CHANGE UP (ref 0.5–1.3)
EGFR: 111 ML/MIN/1.73M2 — SIGNIFICANT CHANGE UP
EOSINOPHIL # BLD AUTO: 0.16 K/UL — SIGNIFICANT CHANGE UP (ref 0–0.5)
EOSINOPHIL NFR BLD AUTO: 2.3 % — SIGNIFICANT CHANGE UP (ref 0–6)
GLUCOSE SERPL-MCNC: 87 MG/DL — SIGNIFICANT CHANGE UP (ref 70–99)
HCT VFR BLD CALC: 36.1 % — LOW (ref 39–50)
HGB BLD-MCNC: 11.8 G/DL — LOW (ref 13–17)
IANC: 4.27 K/UL — SIGNIFICANT CHANGE UP (ref 1.8–7.4)
IMM GRANULOCYTES NFR BLD AUTO: 0.3 % — SIGNIFICANT CHANGE UP (ref 0–0.9)
INR BLD: 1.11 RATIO — SIGNIFICANT CHANGE UP (ref 0.85–1.18)
LYMPHOCYTES # BLD AUTO: 1.65 K/UL — SIGNIFICANT CHANGE UP (ref 1–3.3)
LYMPHOCYTES # BLD AUTO: 23.7 % — SIGNIFICANT CHANGE UP (ref 13–44)
MCHC RBC-ENTMCNC: 27.4 PG — SIGNIFICANT CHANGE UP (ref 27–34)
MCHC RBC-ENTMCNC: 32.7 GM/DL — SIGNIFICANT CHANGE UP (ref 32–36)
MCV RBC AUTO: 83.8 FL — SIGNIFICANT CHANGE UP (ref 80–100)
MONOCYTES # BLD AUTO: 0.83 K/UL — SIGNIFICANT CHANGE UP (ref 0–0.9)
MONOCYTES NFR BLD AUTO: 11.9 % — SIGNIFICANT CHANGE UP (ref 2–14)
NEUTROPHILS # BLD AUTO: 4.27 K/UL — SIGNIFICANT CHANGE UP (ref 1.8–7.4)
NEUTROPHILS NFR BLD AUTO: 61.5 % — SIGNIFICANT CHANGE UP (ref 43–77)
NRBC # BLD: 0 /100 WBCS — SIGNIFICANT CHANGE UP (ref 0–0)
NRBC # FLD: 0 K/UL — SIGNIFICANT CHANGE UP (ref 0–0)
PLATELET # BLD AUTO: 325 K/UL — SIGNIFICANT CHANGE UP (ref 150–400)
POTASSIUM SERPL-MCNC: SIGNIFICANT CHANGE UP MMOL/L (ref 3.5–5.3)
POTASSIUM SERPL-SCNC: SIGNIFICANT CHANGE UP MMOL/L (ref 3.5–5.3)
PROT SERPL-MCNC: 7.8 G/DL — SIGNIFICANT CHANGE UP (ref 6–8.3)
PROTHROM AB SERPL-ACNC: 12.5 SEC — SIGNIFICANT CHANGE UP (ref 9.5–13)
RBC # BLD: 4.31 M/UL — SIGNIFICANT CHANGE UP (ref 4.2–5.8)
RBC # FLD: 13.2 % — SIGNIFICANT CHANGE UP (ref 10.3–14.5)
RH IG SCN BLD-IMP: POSITIVE — SIGNIFICANT CHANGE UP
SODIUM SERPL-SCNC: 133 MMOL/L — LOW (ref 135–145)
WBC # BLD: 6.95 K/UL — SIGNIFICANT CHANGE UP (ref 3.8–10.5)
WBC # FLD AUTO: 6.95 K/UL — SIGNIFICANT CHANGE UP (ref 3.8–10.5)

## 2024-05-06 PROCEDURE — 99285 EMERGENCY DEPT VISIT HI MDM: CPT

## 2024-05-06 PROCEDURE — 73030 X-RAY EXAM OF SHOULDER: CPT | Mod: 26,LT,RT

## 2024-05-06 NOTE — ED ADULT NURSE NOTE - OBJECTIVE STATEMENT
Pt a&ox4 ambulatory sent by MD for surgical revision of left hand wound. Pmh PE on eliquis. On assessment Pt a&ox4 ambulatory sent by MD for surgical revision of left hand wound. Pmh PE on eliquis. On assessment pt well appearing. respirations even and unlabored. Pt denies chest pain, shortness of breath, numbness/tingling. 22G IV placed to right hand. Labs collected and sent. Family at bedside. Stretcher in lowest position, wheels locked, appropriate side rails in place, call bell in reach.

## 2024-05-06 NOTE — ED ADULT NURSE NOTE - NSFALLUNIVINTERV_ED_ALL_ED
Bed/Stretcher in lowest position, wheels locked, appropriate side rails in place/Call bell, personal items and telephone in reach/Instruct patient to call for assistance before getting out of bed/chair/stretcher/Non-slip footwear applied when patient is off stretcher/[place] to call system/Physically safe environment - no spills, clutter or unnecessary equipment/Purposeful proactive rounding/Room/bathroom lighting operational, light cord in reach
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
## 2024-05-06 NOTE — ED PROVIDER NOTE - CLINICAL SUMMARY MEDICAL DECISION MAKING FREE TEXT BOX
Will repeat shoulder xrays to visualize progression of fractures healing and will order pre-surgical labs in anticipation of admission for graft placement of left hand wound by plastics.

## 2024-05-06 NOTE — ED ADULT TRIAGE NOTE - AS HEIGHT TYPE
stated conducted a detailed discussion... I had a detailed discussion with the patient and/or guardian regarding the historical points, exam findings, and any diagnostic results supporting the discharge/admit diagnosis.

## 2024-05-07 LAB — BLD GP AB SCN SERPL QL: NEGATIVE — SIGNIFICANT CHANGE UP

## 2024-05-07 RX ORDER — METOPROLOL TARTRATE 50 MG
25 TABLET ORAL EVERY 8 HOURS
Refills: 0 | Status: DISCONTINUED | OUTPATIENT
Start: 2024-05-07 | End: 2024-05-11

## 2024-05-07 RX ORDER — ATORVASTATIN CALCIUM 80 MG/1
20 TABLET, FILM COATED ORAL AT BEDTIME
Refills: 0 | Status: DISCONTINUED | OUTPATIENT
Start: 2024-05-07 | End: 2024-05-11

## 2024-05-07 RX ORDER — ENOXAPARIN SODIUM 100 MG/ML
110 INJECTION SUBCUTANEOUS EVERY 12 HOURS
Refills: 0 | Status: DISCONTINUED | OUTPATIENT
Start: 2024-05-07 | End: 2024-05-08

## 2024-05-07 RX ORDER — OXYCODONE HYDROCHLORIDE 5 MG/1
5 TABLET ORAL EVERY 6 HOURS
Refills: 0 | Status: DISCONTINUED | OUTPATIENT
Start: 2024-05-07 | End: 2024-05-11

## 2024-05-07 RX ORDER — CHLORHEXIDINE GLUCONATE 213 G/1000ML
1 SOLUTION TOPICAL DAILY
Refills: 0 | Status: DISCONTINUED | OUTPATIENT
Start: 2024-05-07 | End: 2024-05-11

## 2024-05-07 RX ORDER — GABAPENTIN 400 MG/1
300 CAPSULE ORAL THREE TIMES A DAY
Refills: 0 | Status: DISCONTINUED | OUTPATIENT
Start: 2024-05-07 | End: 2024-05-11

## 2024-05-07 RX ADMIN — GABAPENTIN 300 MILLIGRAM(S): 400 CAPSULE ORAL at 21:11

## 2024-05-07 RX ADMIN — OXYCODONE HYDROCHLORIDE 5 MILLIGRAM(S): 5 TABLET ORAL at 23:06

## 2024-05-07 RX ADMIN — CHLORHEXIDINE GLUCONATE 1 APPLICATION(S): 213 SOLUTION TOPICAL at 13:35

## 2024-05-07 RX ADMIN — GABAPENTIN 300 MILLIGRAM(S): 400 CAPSULE ORAL at 13:30

## 2024-05-07 RX ADMIN — ENOXAPARIN SODIUM 110 MILLIGRAM(S): 100 INJECTION SUBCUTANEOUS at 18:23

## 2024-05-07 RX ADMIN — ATORVASTATIN CALCIUM 20 MILLIGRAM(S): 80 TABLET, FILM COATED ORAL at 21:11

## 2024-05-07 RX ADMIN — Medication 25 MILLIGRAM(S): at 21:11

## 2024-05-07 RX ADMIN — Medication 25 MILLIGRAM(S): at 13:35

## 2024-05-07 NOTE — PATIENT PROFILE ADULT - FALL HARM RISK - ATTEMPT OOB
Vaccine Information Statement(s) was given today. This has been reviewed, questions answered, and verbal consent given by Patient for injection(s) and administration of Pneumococcal Polysaccharide (PPSV).  [facility] dispensed vaccine administered.    Patient tolerated without incident. See immunization grid for documentation.   No
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
## 2024-05-07 NOTE — PATIENT PROFILE ADULT - ARE SIGNIFICANT INDICATORS COMPLETE.
Medical History  History Comments   Hypertension    GERD (gastroesophageal reflux disease)    Irregular heart beat    Lyme disease    PTSD (post-traumatic stress disorder)      Procedure:  COLONOSCOPY    Relevant Problems   ANESTHESIA (within normal limits)        Physical Exam    Airway    Mallampati score: II  TM Distance: <3 FB  Neck ROM: full     Dental       Cardiovascular  Rate: normal,     Pulmonary  Pulmonary exam normal     Other Findings  Per pt denies anything remaining that is loose or removeable      Anesthesia Plan  ASA Score- 2     Anesthesia Type- IV sedation with anesthesia with ASA Monitors  Additional Monitors:   Airway Plan:     Comment: Per patient, appropriately NPO, denies active CP/SOB/wheezing/symptoms related to heartburn/nausea/vomiting  Plan Factors-Exercise tolerance (METS): >4 METS  Chart reviewed  Patient summary reviewed  Patient is not a current smoker  Induction- intravenous  Postoperative Plan-     Informed Consent- Anesthetic plan and risks discussed with patient  I personally reviewed this patient with the CRNA  Discussed and agreed on the Anesthesia Plan with the CRNA  Kale Caba Yes
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
## 2024-05-07 NOTE — H&P ADULT - NSHPPHYSICALEXAM_GEN_ALL_CORE
Vital Signs Last 24 Hrs  T(C): 37.1 (07 May 2024 06:03), Max: 37.2 (07 May 2024 00:27)  T(F): 98.7 (07 May 2024 06:03), Max: 98.9 (07 May 2024 00:27)  HR: 81 (07 May 2024 06:03) (81 - 111)  BP: 143/72 (07 May 2024 06:03) (126/59 - 166/93)  BP(mean): --  RR: 17 (07 May 2024 06:03) (16 - 18)  SpO2: 100% (07 May 2024 06:03) (99% - 100%)    Appearance: Normal	  HEENT:   Normal oral mucosa, PERRL, EOMI	  Lymphatic: No lymphadenopathy , no edema  Cardiovascular: Normal S1 S2, No JVD, No murmurs , Peripheral pulses palpable 2+ bilaterally  Respiratory: Lungs clear to auscultation, normal effort 	  Gastrointestinal:  Soft, Non-tender, + BS	  Skin: No rashes, No ecchymoses, No cyanosis, warm to touch  Musculoskeletal: LUE dressing   Psychiatry:  Mood & affect appropriate  Ext: No edema
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
## 2024-05-07 NOTE — H&P ADULT - NSHPREVIEWOFSYSTEMS_GEN_ALL_CORE
REVIEW OF SYSTEMS:    CONSTITUTIONAL: No weakness, fevers or chills  EYES/ENT: No visual changes;  No vertigo or throat pain   NECK: No pain or stiffness  RESPIRATORY: No cough, wheezing, hemoptysis; No shortness of breath  CARDIOVASCULAR: No chest pain or palpitations  GASTROINTESTINAL: No abdominal or epigastric pain. No nausea, vomiting, or hematemesis; No diarrhea or constipation. No melena or hematochezia.  GENITOURINARY: No dysuria, frequency or hematuria  NEUROLOGICAL: No numbness or weakness  SKIN: L UE dressing   All other review of systems is negative unless indicated above.

## 2024-05-07 NOTE — ED ADULT NURSE REASSESSMENT NOTE - NS ED NURSE REASSESS COMMENT FT1
Pt a&ox4 offers no complaints at this time. Respirations even and unlabored. VSS. Pt going to [facility]. Report given to KALEE Carrera.

## 2024-05-07 NOTE — PATIENT PROFILE ADULT - FALL HARM RISK - HARM RISK INTERVENTIONS
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
## 2024-05-08 ENCOUNTER — TRANSCRIPTION ENCOUNTER (OUTPATIENT)
Age: 54
End: 2024-05-08

## 2024-05-08 LAB
A1C WITH ESTIMATED AVERAGE GLUCOSE RESULT: 5 % — SIGNIFICANT CHANGE UP (ref 4–5.6)
ALBUMIN SERPL ELPH-MCNC: 3.6 G/DL — SIGNIFICANT CHANGE UP (ref 3.3–5)
ALP SERPL-CCNC: 115 U/L — SIGNIFICANT CHANGE UP (ref 40–120)
ALT FLD-CCNC: 14 U/L — SIGNIFICANT CHANGE UP (ref 4–41)
ANION GAP SERPL CALC-SCNC: 14 MMOL/L — SIGNIFICANT CHANGE UP (ref 7–14)
AST SERPL-CCNC: 13 U/L — SIGNIFICANT CHANGE UP (ref 4–40)
BASOPHILS # BLD AUTO: 0.02 K/UL — SIGNIFICANT CHANGE UP (ref 0–0.2)
BASOPHILS NFR BLD AUTO: 0.4 % — SIGNIFICANT CHANGE UP (ref 0–2)
BILIRUB DIRECT SERPL-MCNC: <0.2 MG/DL — SIGNIFICANT CHANGE UP (ref 0–0.3)
BILIRUB INDIRECT FLD-MCNC: >0.3 MG/DL — SIGNIFICANT CHANGE UP (ref 0–1)
BILIRUB SERPL-MCNC: 0.5 MG/DL — SIGNIFICANT CHANGE UP (ref 0.2–1.2)
BILIRUB SERPL-MCNC: 0.5 MG/DL — SIGNIFICANT CHANGE UP (ref 0.2–1.2)
BUN SERPL-MCNC: 9 MG/DL — SIGNIFICANT CHANGE UP (ref 7–23)
CALCIUM SERPL-MCNC: 9.3 MG/DL — SIGNIFICANT CHANGE UP (ref 8.4–10.5)
CHLORIDE SERPL-SCNC: 103 MMOL/L — SIGNIFICANT CHANGE UP (ref 98–107)
CHOLEST SERPL-MCNC: 108 MG/DL — SIGNIFICANT CHANGE UP
CO2 SERPL-SCNC: 22 MMOL/L — SIGNIFICANT CHANGE UP (ref 22–31)
CREAT SERPL-MCNC: 0.82 MG/DL — SIGNIFICANT CHANGE UP (ref 0.5–1.3)
EGFR: 105 ML/MIN/1.73M2 — SIGNIFICANT CHANGE UP
EOSINOPHIL # BLD AUTO: 0.16 K/UL — SIGNIFICANT CHANGE UP (ref 0–0.5)
EOSINOPHIL NFR BLD AUTO: 3 % — SIGNIFICANT CHANGE UP (ref 0–6)
ESTIMATED AVERAGE GLUCOSE: 97 — SIGNIFICANT CHANGE UP
GLUCOSE SERPL-MCNC: 89 MG/DL — SIGNIFICANT CHANGE UP (ref 70–99)
HCT VFR BLD CALC: 34.8 % — LOW (ref 39–50)
HDLC SERPL-MCNC: 36 MG/DL — LOW
HGB BLD-MCNC: 11.4 G/DL — LOW (ref 13–17)
IANC: 2.61 K/UL — SIGNIFICANT CHANGE UP (ref 1.8–7.4)
IMM GRANULOCYTES NFR BLD AUTO: 0.2 % — SIGNIFICANT CHANGE UP (ref 0–0.9)
LIPID PNL WITH DIRECT LDL SERPL: 61 MG/DL — SIGNIFICANT CHANGE UP
LYMPHOCYTES # BLD AUTO: 1.92 K/UL — SIGNIFICANT CHANGE UP (ref 1–3.3)
LYMPHOCYTES # BLD AUTO: 36.2 % — SIGNIFICANT CHANGE UP (ref 13–44)
MCHC RBC-ENTMCNC: 27.1 PG — SIGNIFICANT CHANGE UP (ref 27–34)
MCHC RBC-ENTMCNC: 32.8 GM/DL — SIGNIFICANT CHANGE UP (ref 32–36)
MCV RBC AUTO: 82.9 FL — SIGNIFICANT CHANGE UP (ref 80–100)
MONOCYTES # BLD AUTO: 0.59 K/UL — SIGNIFICANT CHANGE UP (ref 0–0.9)
MONOCYTES NFR BLD AUTO: 11.1 % — SIGNIFICANT CHANGE UP (ref 2–14)
MRSA PCR RESULT.: SIGNIFICANT CHANGE UP
NEUTROPHILS # BLD AUTO: 2.61 K/UL — SIGNIFICANT CHANGE UP (ref 1.8–7.4)
NEUTROPHILS NFR BLD AUTO: 49.1 % — SIGNIFICANT CHANGE UP (ref 43–77)
NON HDL CHOLESTEROL: 72 MG/DL — SIGNIFICANT CHANGE UP
NRBC # BLD: 0 /100 WBCS — SIGNIFICANT CHANGE UP (ref 0–0)
NRBC # FLD: 0 K/UL — SIGNIFICANT CHANGE UP (ref 0–0)
PLATELET # BLD AUTO: 293 K/UL — SIGNIFICANT CHANGE UP (ref 150–400)
POTASSIUM SERPL-MCNC: 3.7 MMOL/L — SIGNIFICANT CHANGE UP (ref 3.5–5.3)
POTASSIUM SERPL-SCNC: 3.7 MMOL/L — SIGNIFICANT CHANGE UP (ref 3.5–5.3)
PROT SERPL-MCNC: 6.6 G/DL — SIGNIFICANT CHANGE UP (ref 6–8.3)
RBC # BLD: 4.2 M/UL — SIGNIFICANT CHANGE UP (ref 4.2–5.8)
RBC # FLD: 13.4 % — SIGNIFICANT CHANGE UP (ref 10.3–14.5)
S AUREUS DNA NOSE QL NAA+PROBE: SIGNIFICANT CHANGE UP
SODIUM SERPL-SCNC: 139 MMOL/L — SIGNIFICANT CHANGE UP (ref 135–145)
TRIGL SERPL-MCNC: 57 MG/DL — SIGNIFICANT CHANGE UP
TSH SERPL-MCNC: 1.34 UIU/ML — SIGNIFICANT CHANGE UP (ref 0.27–4.2)
WBC # BLD: 5.31 K/UL — SIGNIFICANT CHANGE UP (ref 3.8–10.5)
WBC # FLD AUTO: 5.31 K/UL — SIGNIFICANT CHANGE UP (ref 3.8–10.5)

## 2024-05-08 RX ADMIN — ENOXAPARIN SODIUM 110 MILLIGRAM(S): 100 INJECTION SUBCUTANEOUS at 05:58

## 2024-05-08 RX ADMIN — Medication 25 MILLIGRAM(S): at 14:27

## 2024-05-08 RX ADMIN — Medication 25 MILLIGRAM(S): at 05:59

## 2024-05-08 RX ADMIN — CHLORHEXIDINE GLUCONATE 1 APPLICATION(S): 213 SOLUTION TOPICAL at 11:38

## 2024-05-08 RX ADMIN — GABAPENTIN 300 MILLIGRAM(S): 400 CAPSULE ORAL at 22:10

## 2024-05-08 RX ADMIN — Medication 25 MILLIGRAM(S): at 22:10

## 2024-05-08 RX ADMIN — GABAPENTIN 300 MILLIGRAM(S): 400 CAPSULE ORAL at 05:59

## 2024-05-08 RX ADMIN — GABAPENTIN 300 MILLIGRAM(S): 400 CAPSULE ORAL at 14:24

## 2024-05-08 RX ADMIN — ATORVASTATIN CALCIUM 20 MILLIGRAM(S): 80 TABLET, FILM COATED ORAL at 22:10
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
## 2024-05-09 ENCOUNTER — TRANSCRIPTION ENCOUNTER (OUTPATIENT)
Age: 54
End: 2024-05-09

## 2024-05-09 LAB
ANION GAP SERPL CALC-SCNC: 14 MMOL/L — SIGNIFICANT CHANGE UP (ref 7–14)
APTT BLD: 28.7 SEC — SIGNIFICANT CHANGE UP (ref 24.5–35.6)
BLD GP AB SCN SERPL QL: NEGATIVE — SIGNIFICANT CHANGE UP
BUN SERPL-MCNC: 5 MG/DL — LOW (ref 7–23)
CALCIUM SERPL-MCNC: 9.2 MG/DL — SIGNIFICANT CHANGE UP (ref 8.4–10.5)
CHLORIDE SERPL-SCNC: 101 MMOL/L — SIGNIFICANT CHANGE UP (ref 98–107)
CO2 SERPL-SCNC: 23 MMOL/L — SIGNIFICANT CHANGE UP (ref 22–31)
CREAT SERPL-MCNC: 0.71 MG/DL — SIGNIFICANT CHANGE UP (ref 0.5–1.3)
EGFR: 110 ML/MIN/1.73M2 — SIGNIFICANT CHANGE UP
GLUCOSE SERPL-MCNC: 81 MG/DL — SIGNIFICANT CHANGE UP (ref 70–99)
HCT VFR BLD CALC: 35 % — LOW (ref 39–50)
HGB BLD-MCNC: 11.6 G/DL — LOW (ref 13–17)
INR BLD: 1.09 RATIO — SIGNIFICANT CHANGE UP (ref 0.85–1.18)
MAGNESIUM SERPL-MCNC: 1.5 MG/DL — LOW (ref 1.6–2.6)
MCHC RBC-ENTMCNC: 27.3 PG — SIGNIFICANT CHANGE UP (ref 27–34)
MCHC RBC-ENTMCNC: 33.1 GM/DL — SIGNIFICANT CHANGE UP (ref 32–36)
MCV RBC AUTO: 82.4 FL — SIGNIFICANT CHANGE UP (ref 80–100)
NRBC # BLD: 0 /100 WBCS — SIGNIFICANT CHANGE UP (ref 0–0)
NRBC # FLD: 0 K/UL — SIGNIFICANT CHANGE UP (ref 0–0)
PHOSPHATE SERPL-MCNC: 4.4 MG/DL — SIGNIFICANT CHANGE UP (ref 2.5–4.5)
PLATELET # BLD AUTO: 313 K/UL — SIGNIFICANT CHANGE UP (ref 150–400)
POTASSIUM SERPL-MCNC: 3.8 MMOL/L — SIGNIFICANT CHANGE UP (ref 3.5–5.3)
POTASSIUM SERPL-SCNC: 3.8 MMOL/L — SIGNIFICANT CHANGE UP (ref 3.5–5.3)
PROTHROM AB SERPL-ACNC: 12.2 SEC — SIGNIFICANT CHANGE UP (ref 9.5–13)
RBC # BLD: 4.25 M/UL — SIGNIFICANT CHANGE UP (ref 4.2–5.8)
RBC # FLD: 12.9 % — SIGNIFICANT CHANGE UP (ref 10.3–14.5)
RH IG SCN BLD-IMP: POSITIVE — SIGNIFICANT CHANGE UP
SODIUM SERPL-SCNC: 138 MMOL/L — SIGNIFICANT CHANGE UP (ref 135–145)
WBC # BLD: 5.87 K/UL — SIGNIFICANT CHANGE UP (ref 3.8–10.5)
WBC # FLD AUTO: 5.87 K/UL — SIGNIFICANT CHANGE UP (ref 3.8–10.5)

## 2024-05-09 DEVICE — INTEGRA WOUND MATRIX MESHED BILAYER 4X5": Type: IMPLANTABLE DEVICE | Status: FUNCTIONAL

## 2024-05-09 RX ORDER — MAGNESIUM SULFATE 500 MG/ML
2 VIAL (ML) INJECTION
Refills: 0 | Status: COMPLETED | OUTPATIENT
Start: 2024-05-09 | End: 2024-05-09

## 2024-05-09 RX ORDER — ACETAMINOPHEN 500 MG
650 TABLET ORAL EVERY 6 HOURS
Refills: 0 | Status: DISCONTINUED | OUTPATIENT
Start: 2024-05-09 | End: 2024-05-11

## 2024-05-09 RX ORDER — APIXABAN 2.5 MG/1
5 TABLET, FILM COATED ORAL
Refills: 0 | Status: DISCONTINUED | OUTPATIENT
Start: 2024-05-10 | End: 2024-05-11

## 2024-05-09 RX ADMIN — GABAPENTIN 300 MILLIGRAM(S): 400 CAPSULE ORAL at 05:09

## 2024-05-09 RX ADMIN — GABAPENTIN 300 MILLIGRAM(S): 400 CAPSULE ORAL at 18:56

## 2024-05-09 RX ADMIN — CHLORHEXIDINE GLUCONATE 1 APPLICATION(S): 213 SOLUTION TOPICAL at 19:03

## 2024-05-09 RX ADMIN — Medication 1 TABLET(S): at 18:54

## 2024-05-09 RX ADMIN — Medication 25 GRAM(S): at 11:49

## 2024-05-09 RX ADMIN — Medication 25 MILLIGRAM(S): at 18:54

## 2024-05-09 RX ADMIN — ATORVASTATIN CALCIUM 20 MILLIGRAM(S): 80 TABLET, FILM COATED ORAL at 21:55

## 2024-05-09 RX ADMIN — Medication 25 GRAM(S): at 19:03

## 2024-05-09 RX ADMIN — Medication 25 MILLIGRAM(S): at 05:09
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
## 2024-05-09 NOTE — DISCHARGE NOTE PROVIDER - NSDCFUSCHEDAPPT_GEN_ALL_CORE_FT
Matt Ulloa  [facility]  INFDISEASE [address]  Scheduled Appointment: 05/23/2024

## 2024-05-09 NOTE — DISCHARGE NOTE PROVIDER - CARE PROVIDERS DIRECT ADDRESSES
,DirectAddress_Unknown ,DirectAddress_Unknown,DirectAddress_Unknown,[email or web address]

## 2024-05-09 NOTE — DISCHARGE NOTE PROVIDER - NSDCFUADDINST_GEN_ALL_CORE_FT
Please take bactrim (antibiotics) for 1 week. Please keep wound vac on until follow up appointment with Dr. Tha Valderrama.

## 2024-05-09 NOTE — DISCHARGE NOTE PROVIDER - NSDCCPCAREPLAN_GEN_ALL_CORE_FT
PRINCIPAL DISCHARGE DIAGNOSIS  Diagnosis: Failed skin graft  Assessment and Plan of Treatment: You had a skin graft done by Plastic Surgery on your left hand. Continue the antibiotic as prescribed. Wound vac in place. Follow up with your primary care doctor and plastic surgeon.

## 2024-05-09 NOTE — BRIEF OPERATIVE NOTE - VENOUS THROMBOEMBOLISM PROPHYLAXIS THERAPY
Pt would like to go to [place] for 2nd opinion and possible treatment and surgery.    Pt will be leaving on Saturday, 5/6/23.  Imaging disk has been created, reports printed and slides have been made and pt will p/u slides at [facility].   Pt has an appt with new oncologist and surgeon on 5/9/23 in [facility].   Pt to writed letter and a copy of her photo ID is needed along with Dr Cisneros's ID for slides to be picked up.   Release of information form has been given to Dr Cisneros to have pt sign.      scds

## 2024-05-09 NOTE — DISCHARGE NOTE PROVIDER - NSDCMRMEDTOKEN_GEN_ALL_CORE_FT
amoxicillin-clavulanate 875 mg-125 mg oral tablet: 1 tab(s) orally 2 times a day thru 4/18/24  atorvastatin 20 mg oral tablet: 1 tab(s) orally once a day (at bedtime)  bacitracin 500 units/g topical ointment: Apply topically to affected area 2 times a day  cholecalciferol oral tablet: 2,000 unit(s) orally once a day 2000 unit(s) orally once a day  Eliquis 5 mg oral tablet: 1 tab(s) orally 2 times a day  gabapentin 300 mg oral capsule: 1 cap(s) orally 3 times a day do not take if lethargic, do not drive  lactobacillus acidophilus oral capsule: 1 cap(s) orally 3 times a day (with meals)  metoprolol tartrate 50 mg oral tablet: 0.5 tab(s) orally every 8 hours 25mg dose  mupirocin 2% topical ointment: Apply topically to affected area 2 times a day 1 Apply topically to affected area 2 times a day to Left forearm ulcer  oxyCODONE 5 mg oral capsule: 1 cap(s) orally every 6 hours as needed for  severe pain do NOT DRIVE/do not take if lethargic; followup with your PCP for further management   atorvastatin 20 mg oral tablet: 1 tab(s) orally once a day (at bedtime)  cholecalciferol oral tablet: 2,000 unit(s) orally once a day 2000 unit(s) orally once a day  Eliquis 5 mg oral tablet: 1 tab(s) orally 2 times a day  gabapentin 300 mg oral capsule: 1 cap(s) orally 3 times a day do not take if lethargic, do not drive  metoprolol tartrate 50 mg oral tablet: 0.5 tab(s) orally every 8 hours 25mg dose  oxyCODONE 5 mg oral capsule: 1 cap(s) orally every 6 hours as needed for  severe pain do NOT DRIVE/do not take if lethargic; followup with your PCP for further management   atorvastatin 20 mg oral tablet: 1 tab(s) orally once a day (at bedtime)  Bactrim  mg-160 mg oral tablet: 1 tab(s) orally 2 times a day stop on 5/17 MDD: 2  cholecalciferol oral tablet: 2,000 unit(s) orally once a day 2000 unit(s) orally once a day  Eliquis 5 mg oral tablet: 1 tab(s) orally 2 times a day  gabapentin 300 mg oral capsule: 1 cap(s) orally 3 times a day do not take if lethargic, do not drive  metoprolol tartrate 50 mg oral tablet: 0.5 tab(s) orally every 8 hours 25mg dose  oxyCODONE 5 mg oral capsule: 1 cap(s) orally every 6 hours as needed for  severe pain do NOT DRIVE/do not take if lethargic; followup with your PCP for further management
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
## 2024-05-09 NOTE — BRIEF OPERATIVE NOTE - OPERATION/FINDINGS
Debridement of soft tissue left hand and placement of integra and wound vac. Wound irrigated with triple antibiotic solution.

## 2024-05-09 NOTE — DISCHARGE NOTE PROVIDER - NSDCFUADDAPPT_GEN_ALL_CORE_FT
Follow up with your primary care doctor    Wound Vac in place. Continue as instructed. Follow up with your primary care doctor    Wound Vac in place. Continue as instructed.  Follows up with Dr. Valderrama in his office twice a week while the wound vac is in place. Follow up in the office on Monday 5/13.

## 2024-05-09 NOTE — CHART NOTE - NSCHARTNOTEFT_GEN_A_CORE
Pt s/p I&D and placement of integra and wound vac. Cleared for discharge tomorrow from plastics standpoint. Discharge with home wound vac and 1 week of bactrim.

## 2024-05-09 NOTE — DISCHARGE NOTE PROVIDER - CARE PROVIDER_API CALL
Wayne Valderrama  Plastic Surgery  [address] [place] [address]  Phone: [phone]  Fax: [phone]  Follow Up Time:    Wayne Valderrama  Plastic Surgery  [address] [place] [address]  Phone: [phone]  Fax: [phone]  Follow Up Time:     Krishna Manuel  [facility]  [address] [facility] [address]  [place] [address]  Phone: [phone]  Fax: (   )    -  Follow Up Time:     Matt Ulloa  Infectious Disease  [address] [place] [address]  Phone: [phone]  Fax: [phone]  Follow Up Time:

## 2024-05-09 NOTE — DISCHARGE NOTE PROVIDER - PROVIDER TOKENS
PROVIDER:[TOKEN:[[id number]]] PROVIDER:[TOKEN:[[id number]]],FREE:[LAST:[Sanaz Mariano],FIRST:[Krishna],PHONE:[phone]],FAX:[phone]   [phone]    -],ADDRESS:[[address]  [place] [id number]]],PROVIDER:[TOKEN:[[id number]]]

## 2024-05-10 LAB
ANION GAP SERPL CALC-SCNC: 13 MMOL/L — SIGNIFICANT CHANGE UP (ref 7–14)
BASOPHILS # BLD AUTO: 0.01 K/UL — SIGNIFICANT CHANGE UP (ref 0–0.2)
BASOPHILS NFR BLD AUTO: 0.2 % — SIGNIFICANT CHANGE UP (ref 0–2)
BUN SERPL-MCNC: 10 MG/DL — SIGNIFICANT CHANGE UP (ref 7–23)
CALCIUM SERPL-MCNC: 9 MG/DL — SIGNIFICANT CHANGE UP (ref 8.4–10.5)
CHLORIDE SERPL-SCNC: 100 MMOL/L — SIGNIFICANT CHANGE UP (ref 98–107)
CO2 SERPL-SCNC: 24 MMOL/L — SIGNIFICANT CHANGE UP (ref 22–31)
CREAT SERPL-MCNC: 0.81 MG/DL — SIGNIFICANT CHANGE UP (ref 0.5–1.3)
EGFR: 105 ML/MIN/1.73M2 — SIGNIFICANT CHANGE UP
EOSINOPHIL # BLD AUTO: 0.18 K/UL — SIGNIFICANT CHANGE UP (ref 0–0.5)
EOSINOPHIL NFR BLD AUTO: 2.9 % — SIGNIFICANT CHANGE UP (ref 0–6)
GLUCOSE SERPL-MCNC: 94 MG/DL — SIGNIFICANT CHANGE UP (ref 70–99)
HCT VFR BLD CALC: 37.7 % — LOW (ref 39–50)
HGB BLD-MCNC: 12 G/DL — LOW (ref 13–17)
IANC: 4.04 K/UL — SIGNIFICANT CHANGE UP (ref 1.8–7.4)
IMM GRANULOCYTES NFR BLD AUTO: 0.2 % — SIGNIFICANT CHANGE UP (ref 0–0.9)
LYMPHOCYTES # BLD AUTO: 1.49 K/UL — SIGNIFICANT CHANGE UP (ref 1–3.3)
LYMPHOCYTES # BLD AUTO: 23.7 % — SIGNIFICANT CHANGE UP (ref 13–44)
MAGNESIUM SERPL-MCNC: 2 MG/DL — SIGNIFICANT CHANGE UP (ref 1.6–2.6)
MCHC RBC-ENTMCNC: 26.4 PG — LOW (ref 27–34)
MCHC RBC-ENTMCNC: 31.8 GM/DL — LOW (ref 32–36)
MCV RBC AUTO: 83 FL — SIGNIFICANT CHANGE UP (ref 80–100)
MONOCYTES # BLD AUTO: 0.55 K/UL — SIGNIFICANT CHANGE UP (ref 0–0.9)
MONOCYTES NFR BLD AUTO: 8.8 % — SIGNIFICANT CHANGE UP (ref 2–14)
NEUTROPHILS # BLD AUTO: 4.04 K/UL — SIGNIFICANT CHANGE UP (ref 1.8–7.4)
NEUTROPHILS NFR BLD AUTO: 64.2 % — SIGNIFICANT CHANGE UP (ref 43–77)
NIGHT BLUE STAIN TISS: SIGNIFICANT CHANGE UP
NRBC # BLD: 0 /100 WBCS — SIGNIFICANT CHANGE UP (ref 0–0)
NRBC # FLD: 0 K/UL — SIGNIFICANT CHANGE UP (ref 0–0)
PHOSPHATE SERPL-MCNC: 4.3 MG/DL — SIGNIFICANT CHANGE UP (ref 2.5–4.5)
PLATELET # BLD AUTO: 315 K/UL — SIGNIFICANT CHANGE UP (ref 150–400)
POTASSIUM SERPL-MCNC: 3.9 MMOL/L — SIGNIFICANT CHANGE UP (ref 3.5–5.3)
POTASSIUM SERPL-SCNC: 3.9 MMOL/L — SIGNIFICANT CHANGE UP (ref 3.5–5.3)
RBC # BLD: 4.54 M/UL — SIGNIFICANT CHANGE UP (ref 4.2–5.8)
RBC # FLD: 12.8 % — SIGNIFICANT CHANGE UP (ref 10.3–14.5)
SODIUM SERPL-SCNC: 137 MMOL/L — SIGNIFICANT CHANGE UP (ref 135–145)
SPECIMEN SOURCE: SIGNIFICANT CHANGE UP
WBC # BLD: 6.28 K/UL — SIGNIFICANT CHANGE UP (ref 3.8–10.5)
WBC # FLD AUTO: 6.28 K/UL — SIGNIFICANT CHANGE UP (ref 3.8–10.5)

## 2024-05-10 RX ADMIN — Medication 25 MILLIGRAM(S): at 10:45

## 2024-05-10 RX ADMIN — Medication 1 TABLET(S): at 05:55

## 2024-05-10 RX ADMIN — ATORVASTATIN CALCIUM 20 MILLIGRAM(S): 80 TABLET, FILM COATED ORAL at 21:31

## 2024-05-10 RX ADMIN — GABAPENTIN 300 MILLIGRAM(S): 400 CAPSULE ORAL at 13:27

## 2024-05-10 RX ADMIN — CHLORHEXIDINE GLUCONATE 1 APPLICATION(S): 213 SOLUTION TOPICAL at 11:48

## 2024-05-10 RX ADMIN — GABAPENTIN 300 MILLIGRAM(S): 400 CAPSULE ORAL at 21:31

## 2024-05-10 RX ADMIN — APIXABAN 5 MILLIGRAM(S): 2.5 TABLET, FILM COATED ORAL at 05:55

## 2024-05-10 RX ADMIN — Medication 1 TABLET(S): at 17:30

## 2024-05-10 RX ADMIN — APIXABAN 5 MILLIGRAM(S): 2.5 TABLET, FILM COATED ORAL at 17:30

## 2024-05-10 RX ADMIN — Medication 25 MILLIGRAM(S): at 17:29

## 2024-05-10 RX ADMIN — Medication 650 MILLIGRAM(S): at 21:30

## 2024-05-10 RX ADMIN — Medication 25 MILLIGRAM(S): at 02:43

## 2024-05-10 RX ADMIN — GABAPENTIN 300 MILLIGRAM(S): 400 CAPSULE ORAL at 03:43

## 2024-05-10 RX ADMIN — Medication 650 MILLIGRAM(S): at 22:02
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
## 2024-05-10 NOTE — PHARMACOTHERAPY INTERVENTION NOTE - COMMENTS
Potential discharge medications were reviewed with the patient. Current medication schedule was discussed in detail including: medication name, indication, dose, administration times, treatment duration of antibiotic, side effects, drug interactions, and special instructions. Additionally, patient was educated on apixaban including the purpose and administration. Discussed adverse effects in detail and when to seek medical attention (blood in stool, vomit, etc.). Informed patient to notify all providers he is on this and before any planned procedures. All questions and concerns were answered and addressed. Patient verbalized understanding and was provided with educational handouts.     Dimple Daly, PharmD  Clinical Pharmacy Specialist  [facility] [id number]
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
## 2024-05-11 ENCOUNTER — TRANSCRIPTION ENCOUNTER (OUTPATIENT)
Age: 54
End: 2024-05-11

## 2024-05-11 VITALS
RESPIRATION RATE: 18 BRPM | TEMPERATURE: 99 F | HEART RATE: 95 BPM | OXYGEN SATURATION: 98 % | DIASTOLIC BLOOD PRESSURE: 86 MMHG | SYSTOLIC BLOOD PRESSURE: 133 MMHG

## 2024-05-11 LAB
ANION GAP SERPL CALC-SCNC: 14 MMOL/L — SIGNIFICANT CHANGE UP (ref 7–14)
BASOPHILS # BLD AUTO: 0.02 K/UL — SIGNIFICANT CHANGE UP (ref 0–0.2)
BASOPHILS NFR BLD AUTO: 0.3 % — SIGNIFICANT CHANGE UP (ref 0–2)
BUN SERPL-MCNC: 10 MG/DL — SIGNIFICANT CHANGE UP (ref 7–23)
CALCIUM SERPL-MCNC: 9.5 MG/DL — SIGNIFICANT CHANGE UP (ref 8.4–10.5)
CHLORIDE SERPL-SCNC: 101 MMOL/L — SIGNIFICANT CHANGE UP (ref 98–107)
CO2 SERPL-SCNC: 22 MMOL/L — SIGNIFICANT CHANGE UP (ref 22–31)
CREAT SERPL-MCNC: 0.88 MG/DL — SIGNIFICANT CHANGE UP (ref 0.5–1.3)
CULTURE RESULTS: SIGNIFICANT CHANGE UP
EGFR: 103 ML/MIN/1.73M2 — SIGNIFICANT CHANGE UP
EOSINOPHIL # BLD AUTO: 0.19 K/UL — SIGNIFICANT CHANGE UP (ref 0–0.5)
EOSINOPHIL NFR BLD AUTO: 3.2 % — SIGNIFICANT CHANGE UP (ref 0–6)
GLUCOSE SERPL-MCNC: 95 MG/DL — SIGNIFICANT CHANGE UP (ref 70–99)
HCT VFR BLD CALC: 37.3 % — LOW (ref 39–50)
HGB BLD-MCNC: 12.5 G/DL — LOW (ref 13–17)
IANC: 3.08 K/UL — SIGNIFICANT CHANGE UP (ref 1.8–7.4)
IMM GRANULOCYTES NFR BLD AUTO: 0.2 % — SIGNIFICANT CHANGE UP (ref 0–0.9)
LYMPHOCYTES # BLD AUTO: 2.26 K/UL — SIGNIFICANT CHANGE UP (ref 1–3.3)
LYMPHOCYTES # BLD AUTO: 37.8 % — SIGNIFICANT CHANGE UP (ref 13–44)
MAGNESIUM SERPL-MCNC: 1.9 MG/DL — SIGNIFICANT CHANGE UP (ref 1.6–2.6)
MCHC RBC-ENTMCNC: 27.1 PG — SIGNIFICANT CHANGE UP (ref 27–34)
MCHC RBC-ENTMCNC: 33.5 GM/DL — SIGNIFICANT CHANGE UP (ref 32–36)
MCV RBC AUTO: 80.9 FL — SIGNIFICANT CHANGE UP (ref 80–100)
MONOCYTES # BLD AUTO: 0.42 K/UL — SIGNIFICANT CHANGE UP (ref 0–0.9)
MONOCYTES NFR BLD AUTO: 7 % — SIGNIFICANT CHANGE UP (ref 2–14)
NEUTROPHILS # BLD AUTO: 3.08 K/UL — SIGNIFICANT CHANGE UP (ref 1.8–7.4)
NEUTROPHILS NFR BLD AUTO: 51.5 % — SIGNIFICANT CHANGE UP (ref 43–77)
NRBC # BLD: 0 /100 WBCS — SIGNIFICANT CHANGE UP (ref 0–0)
NRBC # FLD: 0 K/UL — SIGNIFICANT CHANGE UP (ref 0–0)
PHOSPHATE SERPL-MCNC: 4.5 MG/DL — SIGNIFICANT CHANGE UP (ref 2.5–4.5)
PLATELET # BLD AUTO: 334 K/UL — SIGNIFICANT CHANGE UP (ref 150–400)
POTASSIUM SERPL-MCNC: 3.8 MMOL/L — SIGNIFICANT CHANGE UP (ref 3.5–5.3)
POTASSIUM SERPL-SCNC: 3.8 MMOL/L — SIGNIFICANT CHANGE UP (ref 3.5–5.3)
RBC # BLD: 4.61 M/UL — SIGNIFICANT CHANGE UP (ref 4.2–5.8)
RBC # FLD: 12.8 % — SIGNIFICANT CHANGE UP (ref 10.3–14.5)
SODIUM SERPL-SCNC: 137 MMOL/L — SIGNIFICANT CHANGE UP (ref 135–145)
SPECIMEN SOURCE: SIGNIFICANT CHANGE UP
WBC # BLD: 5.98 K/UL — SIGNIFICANT CHANGE UP (ref 3.8–10.5)
WBC # FLD AUTO: 5.98 K/UL — SIGNIFICANT CHANGE UP (ref 3.8–10.5)

## 2024-05-11 RX ADMIN — Medication 1 TABLET(S): at 06:49

## 2024-05-11 RX ADMIN — APIXABAN 5 MILLIGRAM(S): 2.5 TABLET, FILM COATED ORAL at 06:50

## 2024-05-11 RX ADMIN — Medication 25 MILLIGRAM(S): at 10:19

## 2024-05-11 RX ADMIN — CHLORHEXIDINE GLUCONATE 1 APPLICATION(S): 213 SOLUTION TOPICAL at 11:56

## 2024-05-11 RX ADMIN — GABAPENTIN 300 MILLIGRAM(S): 400 CAPSULE ORAL at 06:49

## 2024-05-11 NOTE — DISCHARGE NOTE NURSING/CASE MANAGEMENT/SOCIAL WORK - PATIENT PORTAL LINK FT
You can access the FollowMyHealth Patient Portal offered by [facility] by registering at the following website: [email or web address]. By joining [employer]s FollowMyHealth portal, you will also be able to view your health information using other applications (apps) compatible with our system.

## 2024-05-11 NOTE — PROGRESS NOTE ADULT - SUBJECTIVE AND OBJECTIVE BOX
ANESTHESIA POSTOP CHECK    53y Male POSTOP DAY 1     No COMPLAINTS    NO APPARENT ANESTHESIA COMPLICATIONS      
Name of Patient : TIMI GUERRERO  MRN: [id number]      Subjective: Patient seen and examined. No new events except as noted.   or today     REVIEW OF SYSTEMS:    CONSTITUTIONAL: No weakness, fevers or chills  EYES/ENT: No visual changes;  No vertigo or throat pain   NECK: No pain or stiffness  RESPIRATORY: No cough, wheezing, hemoptysis; No shortness of breath  CARDIOVASCULAR: No chest pain or palpitations  GASTROINTESTINAL: No abdominal or epigastric pain. No nausea, vomiting, or hematemesis; No diarrhea or constipation. No melena or hematochezia.  GENITOURINARY: No dysuria, frequency or hematuria  NEUROLOGICAL: No numbness or weakness  SKIN: No itching, burning, rashes, or lesions   All other review of systems is negative unless indicated above.    MEDICATIONS:  MEDICATIONS  (STANDING):  apixaban 5 milliGRAM(s) Oral two times a day  atorvastatin 20 milliGRAM(s) Oral at bedtime  chlorhexidine 2% Cloths 1 Application(s) Topical daily  gabapentin 300 milliGRAM(s) Oral three times a day  metoprolol tartrate 25 milliGRAM(s) Oral every 8 hours  trimethoprim  160 mG/sulfamethoxazole 800 mG 1 Tablet(s) Oral every 12 hours      PHYSICAL EXAM:  T(C): 37 (05-09-24 @ 21:50), Max: 37.1 (05-09-24 @ 14:22)  HR: 75 (05-09-24 @ 21:50) (72 - 87)  BP: 129/82 (05-09-24 @ 21:50) (107/55 - 141/90)  RR: 17 (05-09-24 @ 21:50) (12 - 18)  SpO2: 100% (05-09-24 @ 21:50) (96% - 100%)  Wt(kg): --  I&O's Summary    09 May 2024 07:01  -  10 May 2024 00:45  --------------------------------------------------------  IN: 0 mL / OUT: 0 mL / NET: 0 mL          Appearance: Normal	  HEENT:  PERRLA   Lymphatic: No lymphadenopathy   Cardiovascular: Normal S1 S2, no JVD  Respiratory: normal effort , clear  Gastrointestinal:  Soft, Non-tender  Skin: No rashes,  warm to touch  Psychiatry:  Mood & affect appropriate  Musculuskeletal: No edema    recent labs, Imaging and EKGs personally reviewed     05-09-24 @ 07:01  -  05-10-24 @ 00:45  --------------------------------------------------------  IN: 0 mL / OUT: 0 mL / NET: 0 mL                          11.6   5.87  )-----------( 313      ( 09 May 2024 05:05 )             35.0               05-09    138  |  101  |  5<L>  ----------------------------<  81  3.8   |  23  |  0.71    Ca    9.2      09 May 2024 05:05  Phos  4.4     05-09  Mg     1.50     05-09    TPro  6.6  /  Alb  3.6  /  TBili  0.5  /  DBili  <0.2  /  AST  13  /  ALT  14  /  AlkPhos  115  05-08    PT/INR - ( 09 May 2024 05:05 )   PT: 12.2 sec;   INR: 1.09 ratio         PTT - ( 09 May 2024 05:05 )  PTT:28.7 sec                   Urinalysis Basic - ( 09 May 2024 05:05 )    Color: x / Appearance: x / SG: x / pH: x  Gluc: 81 mg/dL / Ketone: x  / Bili: x / Urobili: x   Blood: x / Protein: x / Nitrite: x   Leuk Esterase: x / RBC: x / WBC x   Sq Epi: x / Non Sq Epi: x / Bacteria: x              
Patient seen and examined  Plan for I&D left dorsal hand, partial closure, possible integra, possible skin graft  Risks, benefits, alternatives discussed  Ok to restart Eliquis tomorrow   Ok for DC home tomorrow with home wound vac  
Plastic Surgery Progress Note (pg [facility]: [id number], [facility]: [phone])    SUBJECTIVE  The patient was seen and examined. No acute events overnight.    OBJECTIVE  ___________________________________________________  VITAL SIGNS / I&O's   Vital Signs Last 24 Hrs  T(C): 36.7 (08 May 2024 05:50), Max: 37.1 (07 May 2024 20:45)  T(F): 98 (08 May 2024 05:50), Max: 98.7 (07 May 2024 20:45)  HR: 80 (08 May 2024 05:50) (75 - 98)  BP: 121/77 (08 May 2024 05:50) (115/80 - 133/82)  BP(mean): --  RR: 18 (08 May 2024 05:50) (16 - 18)  SpO2: 98% (08 May 2024 05:50) (98% - 100%)    Parameters below as of 08 May 2024 05:50  Patient On (Oxygen Delivery Method): room air          07 May 2024 07:01  -  08 May 2024 07:00  --------------------------------------------------------  IN:    Oral Fluid: 1030 mL  Total IN: 1030 mL    OUT:    VAC (Vacuum Assisted Closure) System (mL): 0 mL    Voided (mL): 1100 mL  Total OUT: 1100 mL    Total NET: -70 mL        ___________________________________________________  PHYSICAL EXAM    -- CONSTITUTIONAL: NAD, lying in bed  -- NEURO: Awake, alert  LUE: hand: vac in place holding suction  ___________________________________________________  LABS                        11.4   5.31  )-----------( 293      ( 08 May 2024 05:40 )             34.8     08 May 2024 05:40    139    |  103    |  9      ----------------------------<  89     3.7     |  22     |  0.82     Ca    9.3        08 May 2024 05:40    TPro  6.6    /  Alb  3.6    /  TBili  0.5    /  DBili  <0.2   /  AST  13     /  ALT  14     /  AlkPhos  115    08 May 2024 05:40    PT/INR - ( 06 May 2024 22:33 )   PT: 12.5 sec;   INR: 1.11 ratio         PTT - ( 06 May 2024 22:33 )  PTT:31.3 sec  CAPILLARY BLOOD GLUCOSE            Urinalysis Basic - ( 08 May 2024 05:40 )    Color: x / Appearance: x / SG: x / pH: x  Gluc: 89 mg/dL / Ketone: x  / Bili: x / Urobili: x   Blood: x / Protein: x / Nitrite: x   Leuk Esterase: x / RBC: x / WBC x   Sq Epi: x / Non Sq Epi: x / Bacteria: x      ___________________________________________________  MICRO  Recent Cultures:    ___________________________________________________  MEDICATIONS  (STANDING):  atorvastatin 20 milliGRAM(s) Oral at bedtime  chlorhexidine 2% Cloths 1 Application(s) Topical daily  enoxaparin Injectable 110 milliGRAM(s) SubCutaneous every 12 hours  gabapentin 300 milliGRAM(s) Oral three times a day  metoprolol tartrate 25 milliGRAM(s) Oral every 8 hours    MEDICATIONS  (PRN):  oxyCODONE    IR 5 milliGRAM(s) Oral every 6 hours PRN Severe Pain (7 - 10)  
Name of Patient : TIMI GUERRERO  MRN: [id number]  Date of visit: 05-10-24       Subjective: Patient seen and examined. No new events except as noted.   Doing okay   S/P OR     REVIEW OF SYSTEMS:    CONSTITUTIONAL: No weakness, fevers or chills  EYES/ENT: No visual changes;  No vertigo or throat pain   NECK: No pain or stiffness  RESPIRATORY: No cough, wheezing, hemoptysis; No shortness of breath  CARDIOVASCULAR: No chest pain or palpitations  GASTROINTESTINAL: No abdominal or epigastric pain. No nausea, vomiting, or hematemesis; No diarrhea or constipation. No melena or hematochezia.  GENITOURINARY: No dysuria, frequency or hematuria  NEUROLOGICAL: No numbness or weakness  SKIN: No itching, burning, rashes, or lesions   All other review of systems is negative unless indicated above.    MEDICATIONS:  MEDICATIONS  (STANDING):  apixaban 5 milliGRAM(s) Oral two times a day  atorvastatin 20 milliGRAM(s) Oral at bedtime  chlorhexidine 2% Cloths 1 Application(s) Topical daily  gabapentin 300 milliGRAM(s) Oral three times a day  metoprolol tartrate 25 milliGRAM(s) Oral every 8 hours  trimethoprim  160 mG/sulfamethoxazole 800 mG 1 Tablet(s) Oral every 12 hours      PHYSICAL EXAM:  T(C): 37 (05-10-24 @ 13:32), Max: 37.1 (05-10-24 @ 05:40)  HR: 94 (05-10-24 @ 17:25) (72 - 94)  BP: 122/71 (05-10-24 @ 13:32) (122/71 - 133/82)  RR: 19 (05-10-24 @ 13:32) (18 - 19)  SpO2: 100% (05-10-24 @ 13:32) (100% - 100%)  Wt(kg): --  I&O's Summary    09 May 2024 07:01  -  10 May 2024 07:00  --------------------------------------------------------  IN: 0 mL / OUT: 0 mL / NET: 0 mL          Appearance: Normal	  HEENT:  PERRLA   Lymphatic: No lymphadenopathy   Cardiovascular: Normal S1 S2, no JVD  Respiratory: normal effort , clear  Gastrointestinal:  Soft, Non-tender  Skin: No rashes,  warm to touch  Psychiatry:  Mood & affect appropriate  Musculuskeletal: No edema    recent labs, Imaging and EKGs personally reviewed     05-09-24 @ 07:01  -  05-10-24 @ 07:00  --------------------------------------------------------  IN: 0 mL / OUT: 0 mL / NET: 0 mL                          12.0   6.28  )-----------( 315      ( 10 May 2024 04:55 )             37.7               05-10    137  |  100  |  10  ----------------------------<  94  3.9   |  24  |  0.81    Ca    9.0      10 May 2024 04:55  Phos  4.3     05-10  Mg     2.00     05-10      PT/INR - ( 09 May 2024 05:05 )   PT: 12.2 sec;   INR: 1.09 ratio         PTT - ( 09 May 2024 05:05 )  PTT:28.7 sec                   Urinalysis Basic - ( 10 May 2024 04:55 )    Color: x / Appearance: x / SG: x / pH: x  Gluc: 94 mg/dL / Ketone: x  / Bili: x / Urobili: x   Blood: x / Protein: x / Nitrite: x   Leuk Esterase: x / RBC: x / WBC x   Sq Epi: x / Non Sq Epi: x / Bacteria: x              
Name of Patient : TIMI GUERRERO  MRN: [id number]      Subjective: Patient seen and examined. No new events except as noted.     REVIEW OF SYSTEMS:    CONSTITUTIONAL: No weakness, fevers or chills  EYES/ENT: No visual changes;  No vertigo or throat pain   NECK: No pain or stiffness  RESPIRATORY: No cough, wheezing, hemoptysis; No shortness of breath  CARDIOVASCULAR: No chest pain or palpitations  GASTROINTESTINAL: No abdominal or epigastric pain. No nausea, vomiting, or hematemesis; No diarrhea or constipation. No melena or hematochezia.  GENITOURINARY: No dysuria, frequency or hematuria  NEUROLOGICAL: No numbness or weakness  SKIN: No itching, burning, rashes, or lesions   All other review of systems is negative unless indicated above.    MEDICATIONS:  MEDICATIONS  (STANDING):  apixaban 5 milliGRAM(s) Oral two times a day  atorvastatin 20 milliGRAM(s) Oral at bedtime  chlorhexidine 2% Cloths 1 Application(s) Topical daily  gabapentin 300 milliGRAM(s) Oral three times a day  metoprolol tartrate 25 milliGRAM(s) Oral every 8 hours  trimethoprim  160 mG/sulfamethoxazole 800 mG 1 Tablet(s) Oral every 12 hours      PHYSICAL EXAM:  T(C): 37.1 (05-11-24 @ 10:25), Max: 37.1 (05-11-24 @ 10:25)  HR: 95 (05-11-24 @ 10:25) (79 - 95)  BP: 133/86 (05-11-24 @ 10:25) (114/65 - 133/86)  RR: 18 (05-11-24 @ 10:25) (18 - 18)  SpO2: 98% (05-11-24 @ 10:25) (98% - 100%)  Wt(kg): --  I&O's Summary    10 May 2024 07:01  -  11 May 2024 07:00  --------------------------------------------------------  IN: 200 mL / OUT: 0 mL / NET: 200 mL          Appearance: Normal	  HEENT:  PERRLA   Lymphatic: No lymphadenopathy   Cardiovascular: Normal S1 S2, no JVD  Respiratory: normal effort , clear  Gastrointestinal:  Soft, Non-tender  Skin: No rashes,  warm to touch  Psychiatry:  Mood & affect appropriate  Musculuskeletal: No edema    recent labs, Imaging and EKGs personally reviewed             05-10-24 @ 07:01  -  05-11-24 @ 07:00  --------------------------------------------------------  IN: 200 mL / OUT: 0 mL / NET: 200 mL            
Name of Patient : TIMI GUERRERO  MRN: [id number]  Date of visit: 05-08-24       Subjective: Patient seen and examined. No new events except as noted.   Doing okay     REVIEW OF SYSTEMS:    CONSTITUTIONAL: No weakness, fevers or chills  EYES/ENT: No visual changes;  No vertigo or throat pain   NECK: No pain or stiffness  RESPIRATORY: No cough, wheezing, hemoptysis; No shortness of breath  CARDIOVASCULAR: No chest pain or palpitations  GASTROINTESTINAL: No abdominal or epigastric pain. No nausea, vomiting, or hematemesis; No diarrhea or constipation. No melena or hematochezia.  GENITOURINARY: No dysuria, frequency or hematuria  NEUROLOGICAL: No numbness or weakness  SKIN: No itching, burning, rashes, or lesions   All other review of systems is negative unless indicated above.    MEDICATIONS:  MEDICATIONS  (STANDING):  atorvastatin 20 milliGRAM(s) Oral at bedtime  chlorhexidine 2% Cloths 1 Application(s) Topical daily  enoxaparin Injectable 110 milliGRAM(s) SubCutaneous every 12 hours  gabapentin 300 milliGRAM(s) Oral three times a day  metoprolol tartrate 25 milliGRAM(s) Oral every 8 hours      PHYSICAL EXAM:  T(C): 36.7 (05-08-24 @ 14:29), Max: 37.1 (05-07-24 @ 20:45)  HR: 83 (05-08-24 @ 14:29) (80 - 92)  BP: 123/75 (05-08-24 @ 14:29) (121/77 - 133/82)  RR: 18 (05-08-24 @ 14:29) (18 - 18)  SpO2: 100% (05-08-24 @ 14:29) (98% - 100%)  Wt(kg): --  I&O's Summary    07 May 2024 07:01  -  08 May 2024 07:00  --------------------------------------------------------  IN: 1030 mL / OUT: 1100 mL / NET: -70 mL        Weight (kg): 111 (05-08 @ 10:52)    Appearance: Normal	  HEENT:  PERRLA   Lymphatic: No lymphadenopathy   Cardiovascular: Normal S1 S2, no JVD  Respiratory: normal effort , clear  Gastrointestinal:  Soft, Non-tender  Skin: No rashes,  warm to touch  Psychiatry:  Mood & affect appropriate  Musculuskeletal: No edema, wound vac    recent labs, Imaging and EKGs personally reviewed     05-07-24 @ 07:01  -  05-08-24 @ 07:00  --------------------------------------------------------  IN: 1030 mL / OUT: 1100 mL / NET: -70 mL                          11.4   5.31  )-----------( 293      ( 08 May 2024 05:40 )             34.8               05-08    139  |  103  |  9   ----------------------------<  89  3.7   |  22  |  0.82    Ca    9.3      08 May 2024 05:40    TPro  6.6  /  Alb  3.6  /  TBili  0.5  /  DBili  <0.2  /  AST  13  /  ALT  14  /  AlkPhos  115  05-08    PT/INR - ( 06 May 2024 22:33 )   PT: 12.5 sec;   INR: 1.11 ratio         PTT - ( 06 May 2024 22:33 )  PTT:31.3 sec                   Urinalysis Basic - ( 08 May 2024 05:40 )    Color: x / Appearance: x / SG: x / pH: x  Gluc: 89 mg/dL / Ketone: x  / Bili: x / Urobili: x   Blood: x / Protein: x / Nitrite: x   Leuk Esterase: x / RBC: x / WBC x   Sq Epi: x / Non Sq Epi: x / Bacteria: x              
Plastic Surgery Progress Note (pg [facility]: [id number], [facility]: [phone])    SUBJECTIVE    The patient was seen and examined.     OBJECTIVE  ___________________________________________________  VITAL SIGNS / I&O's   Vital Signs Last 24 Hrs  T(C): 37.1 (07 May 2024 06:03), Max: 37.2 (07 May 2024 00:27)  T(F): 98.7 (07 May 2024 06:03), Max: 98.9 (07 May 2024 00:27)  HR: 81 (07 May 2024 06:03) (81 - 111)  BP: 143/72 (07 May 2024 06:03) (126/59 - 166/93)  BP(mean): --  RR: 17 (07 May 2024 06:03) (16 - 18)  SpO2: 100% (07 May 2024 06:03) (99% - 100%)    Parameters below as of 07 May 2024 06:03  Patient On (Oxygen Delivery Method): room air          06 May 2024 07:01  -  07 May 2024 07:00  --------------------------------------------------------  IN:    Oral Fluid: 100 mL  Total IN: 100 mL    OUT:  Total OUT: 0 mL    Total NET: 100 mL        ___________________________________________________  PHYSICAL EXAM    Gen: No acute distress  Resp: stable on room air  Hand: 5 cm dorsal hand wound with exposed tendon. granulation tissue at base of wound.    ___________________________________________________  LABS                        11.8   6.95  )-----------( 325      ( 06 May 2024 22:33 )             36.1     06 May 2024 22:33    133    |  100    |  8      ----------------------------<  87     TNP     |  17     |  0.68     Ca    9.0        06 May 2024 22:33    TPro  7.8    /  Alb  3.6    /  TBili  0.6    /  DBili  x      /  AST  43     /  ALT  22     /  AlkPhos  142    06 May 2024 22:33    PT/INR - ( 06 May 2024 22:33 )   PT: 12.5 sec;   INR: 1.11 ratio         PTT - ( 06 May 2024 22:33 )  PTT:31.3 sec  CAPILLARY BLOOD GLUCOSE            Urinalysis Basic - ( 06 May 2024 22:33 )    Color: x / Appearance: x / SG: x / pH: x  Gluc: 87 mg/dL / Ketone: x  / Bili: x / Urobili: x   Blood: x / Protein: x / Nitrite: x   Leuk Esterase: x / RBC: x / WBC x   Sq Epi: x / Non Sq Epi: x / Bacteria: x      ___________________________________________________  MICRO  Recent Cultures:    ___________________________________________________  MEDICATIONS  (STANDING):    MEDICATIONS  (PRN):      
Plastic Surgery Progress Note (pg [facility]: [id number], [facility]: [phone])    SUBJECTIVE  The patient was seen and examined.     OBJECTIVE  ___________________________________________________  VITAL SIGNS / I&O's   Vital Signs Last 24 Hrs  T(C): 37.1 (10 May 2024 05:40), Max: 37.1 (09 May 2024 14:22)  T(F): 98.8 (10 May 2024 05:40), Max: 98.8 (10 May 2024 05:40)  HR: 76 (10 May 2024 05:40) (72 - 87)  BP: 125/84 (10 May 2024 05:40) (107/55 - 141/90)  BP(mean): 92 (09 May 2024 17:00) (92 - 98)  RR: 18 (10 May 2024 05:40) (12 - 18)  SpO2: 100% (10 May 2024 05:40) (96% - 100%)    Parameters below as of 10 May 2024 05:40  Patient On (Oxygen Delivery Method): room air          09 May 2024 07:01  -  10 May 2024 07:00  --------------------------------------------------------  IN:  Total IN: 0 mL    OUT:    VAC (Vacuum Assisted Closure) System (mL): 0 mL  Total OUT: 0 mL    Total NET: 0 mL        ___________________________________________________  PHYSICAL EXAM    LUE: vac holding suction    ___________________________________________________  LABS                        12.0   6.28  )-----------( 315      ( 10 May 2024 04:55 )             37.7     10 May 2024 04:55    137    |  100    |  10     ----------------------------<  94     3.9     |  24     |  0.81     Ca    9.0        10 May 2024 04:55  Phos  4.3       10 May 2024 04:55  Mg     2.00      10 May 2024 04:55      PT/INR - ( 09 May 2024 05:05 )   PT: 12.2 sec;   INR: 1.09 ratio         PTT - ( 09 May 2024 05:05 )  PTT:28.7 sec  CAPILLARY BLOOD GLUCOSE            Urinalysis Basic - ( 10 May 2024 04:55 )    Color: x / Appearance: x / SG: x / pH: x  Gluc: 94 mg/dL / Ketone: x  / Bili: x / Urobili: x   Blood: x / Protein: x / Nitrite: x   Leuk Esterase: x / RBC: x / WBC x   Sq Epi: x / Non Sq Epi: x / Bacteria: x      ___________________________________________________  MICRO  Recent Cultures:  Specimen Source: .Other left hand culture #2, 05-09 @ 15:17; Results   Testing in progress; Gram Stain: --; Organism: --  Specimen Source: .Other left hand culture #1, 05-09 @ 15:16; Results   Testing in progress; Gram Stain: --; Organism: --    ___________________________________________________  MEDICATIONS  (STANDING):  apixaban 5 milliGRAM(s) Oral two times a day  atorvastatin 20 milliGRAM(s) Oral at bedtime  chlorhexidine 2% Cloths 1 Application(s) Topical daily  gabapentin 300 milliGRAM(s) Oral three times a day  metoprolol tartrate 25 milliGRAM(s) Oral every 8 hours  trimethoprim  160 mG/sulfamethoxazole 800 mG 1 Tablet(s) Oral every 12 hours    MEDICATIONS  (PRN):  acetaminophen     Tablet .. 650 milliGRAM(s) Oral every 6 hours PRN Temp greater or equal to 38C (100.4F), Mild Pain (1 - 3)  oxyCODONE    IR 5 milliGRAM(s) Oral every 6 hours PRN Severe Pain (7 - 10)          Assessment & Plan      [facility]  Plastic & Reconstructive Surgery, PGY-1  #[id number]

## 2024-05-11 NOTE — PROGRESS NOTE ADULT - PROVIDER SPECIALTY LIST ADULT
Internal Medicine
Anesthesia
Plastic Surgery
Internal Medicine
Plastic Surgery
Cephalexin Pregnancy And Lactation Text: This medication is Pregnancy Category B and considered safe during pregnancy.  It is also excreted in breast milk but can be used safely for shorter doses.

## 2024-05-11 NOTE — DISCHARGE NOTE NURSING/CASE MANAGEMENT/SOCIAL WORK - NSDCPEFALRISK_GEN_ALL_CORE
For information on Fall & Injury Prevention, visit: [email or web address] OR  [email or web address] OR  https://www.cdc.gov/steadi/patient.html
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
## 2024-05-11 NOTE — CHART NOTE - NSCHARTNOTEFT_GEN_A_CORE
Medicine PA Note    Pt received a wound vac yesterday by his Plastic Surgeon for his Left hand surgical wound. Pt's wound vac is not compatible with the home care agency. Pt states he sees his Plastic Surgeon twice a week in his office as outpatient and they will maintain and the vac and change the dressings. He states he is willing to take responsibility of not having home care maintenance for the wound vac. Pt is medically cleared for discharge home and will need to follow up as outpatient for close monitoring.    Benjamin Jara PA-C  x [phone]

## 2024-05-11 NOTE — DISCHARGE NOTE NURSING/CASE MANAGEMENT/SOCIAL WORK - NSDCFUADDAPPT_GEN_ALL_CORE_FT
Follow up with your primary care doctor    Wound Vac in place. Continue as instructed.  Follows up with Dr. Valderrama in his office twice a week while the wound vac is in place. Follow up in the office on Monday 5/13.

## 2024-05-12 LAB
-  CLINDAMYCIN: SIGNIFICANT CHANGE UP
-  ERYTHROMYCIN: SIGNIFICANT CHANGE UP
-  GENTAMICIN: SIGNIFICANT CHANGE UP
-  OXACILLIN: SIGNIFICANT CHANGE UP
-  PENICILLIN: SIGNIFICANT CHANGE UP
-  RIFAMPIN: SIGNIFICANT CHANGE UP
-  TETRACYCLINE: SIGNIFICANT CHANGE UP
-  TRIMETHOPRIM/SULFAMETHOXAZOLE: SIGNIFICANT CHANGE UP
-  VANCOMYCIN: SIGNIFICANT CHANGE UP
METHOD TYPE: SIGNIFICANT CHANGE UP

## 2024-05-14 LAB
CULTURE RESULTS: ABNORMAL
SPECIMEN SOURCE: SIGNIFICANT CHANGE UP

## 2024-05-15 LAB
CULTURE RESULTS: ABNORMAL
ORGANISM # SPEC MICROSCOPIC CNT: ABNORMAL
ORGANISM # SPEC MICROSCOPIC CNT: ABNORMAL
SPECIMEN SOURCE: SIGNIFICANT CHANGE UP

## 2024-05-21 ENCOUNTER — APPOINTMENT (OUTPATIENT)
Dept: PULMONOLOGY | Facility: CLINIC | Age: 54
End: 2024-05-21
Payer: MEDICAID

## 2024-05-21 VITALS
DIASTOLIC BLOOD PRESSURE: 90 MMHG | WEIGHT: 230 LBS | RESPIRATION RATE: 16 BRPM | BODY MASS INDEX: 31.15 KG/M2 | HEART RATE: 90 BPM | SYSTOLIC BLOOD PRESSURE: 133 MMHG | HEIGHT: 72 IN | OXYGEN SATURATION: 97 %

## 2024-05-21 PROCEDURE — 94729 DIFFUSING CAPACITY: CPT

## 2024-05-21 PROCEDURE — 95012 NITRIC OXIDE EXP GAS DETER: CPT

## 2024-05-21 PROCEDURE — 94727 GAS DIL/WSHOT DETER LNG VOL: CPT

## 2024-05-21 PROCEDURE — 94060 EVALUATION OF WHEEZING: CPT

## 2024-05-21 PROCEDURE — 99204 OFFICE O/P NEW MOD 45 MIN: CPT | Mod: 25

## 2024-05-21 RX ORDER — BUDESONIDE AND FORMOTEROL FUMARATE DIHYDRATE 160; 4.5 UG/1; UG/1
160-4.5 AEROSOL RESPIRATORY (INHALATION) TWICE DAILY
Qty: 1 | Refills: 2 | Status: ACTIVE | COMMUNITY
Start: 2024-05-21 | End: 1900-01-01

## 2024-05-22 NOTE — ADDENDUM
[FreeTextEntry1] : I, Barb Whitecolin, acted solely as a scribe for Dr. Naheed Sosa D.O., on this date 05/21/2024.   All medical record entries made by the Scribe were at my, Dr. Naheed Sosa D.O., direction and personally dictated by me on 05/21/2024. I have reviewed the chart and agree that the record accurately reflects my personal performance of the history, physical exam, assessment and plan. I have also personally directed, reviewed, and agreed with the chart.

## 2024-05-22 NOTE — PROCEDURE
[FreeTextEntry1] : Pulmonary Function Test obtained in office today which revealed: Spirometry: Suggestive of moderate restrictive defect with significant improvement post bronchodilator, consistent with asthma, Lung Volume: Within normal limits with air trapping, Diffusion: Within normal limits. ___  Exhaled Nitric Oxide             Final  No Documents Attached    	Test  	Result  	Flag	Reference	Goal	Last Verified  	Exhaled Nitric Oxide	22	 	 		REQUIRED  Ordered by: JAYE BEE       Collected/Examined: [id number] 10:00AM       Verification Required       Stage: Final       Performed at: In Office       Performed by: JAYE BEE       Resulted: [id number] 10:00AM       Last Updated: [id number] 10:00AM
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
## 2024-05-22 NOTE — ASSESSMENT
[FreeTextEntry1] : Obtained and reviewed pulmonary function test, NIOX results with patient today.  Prescribed Symbicort 160/4.5 mcg HFA, 2 puffs twice a day for asthma treatment. Continue Eliquis for recent diagnosed PE for a total of 6 months at this point.   Return for pulmonary follow up in 3 weeks.
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
## 2024-05-23 ENCOUNTER — APPOINTMENT (OUTPATIENT)
Dept: INFECTIOUS DISEASE | Facility: CLINIC | Age: 54
End: 2024-05-23

## 2024-05-27 ENCOUNTER — TRANSCRIPTION ENCOUNTER (OUTPATIENT)
Age: 54
End: 2024-05-27

## 2024-06-03 ENCOUNTER — APPOINTMENT (OUTPATIENT)
Dept: INFECTIOUS DISEASE | Facility: CLINIC | Age: 54
End: 2024-06-03

## 2024-06-07 ENCOUNTER — TRANSCRIPTION ENCOUNTER (OUTPATIENT)
Age: 54
End: 2024-06-07

## 2024-06-08 LAB
CULTURE RESULTS: SIGNIFICANT CHANGE UP
CULTURE RESULTS: SIGNIFICANT CHANGE UP
SPECIMEN SOURCE: SIGNIFICANT CHANGE UP
SPECIMEN SOURCE: SIGNIFICANT CHANGE UP

## 2024-06-12 ENCOUNTER — TRANSCRIPTION ENCOUNTER (OUTPATIENT)
Age: 54
End: 2024-06-12

## 2024-06-14 ENCOUNTER — APPOINTMENT (OUTPATIENT)
Dept: PULMONOLOGY | Facility: CLINIC | Age: 54
End: 2024-06-14
Payer: MEDICAID

## 2024-06-14 VITALS
DIASTOLIC BLOOD PRESSURE: 99 MMHG | OXYGEN SATURATION: 97 % | HEART RATE: 79 BPM | SYSTOLIC BLOOD PRESSURE: 151 MMHG | RESPIRATION RATE: 16 BRPM

## 2024-06-14 DIAGNOSIS — R05.9 COUGH, UNSPECIFIED: ICD-10-CM

## 2024-06-14 DIAGNOSIS — J45.909 UNSPECIFIED ASTHMA, UNCOMPLICATED: ICD-10-CM

## 2024-06-14 DIAGNOSIS — I10 ESSENTIAL (PRIMARY) HYPERTENSION: ICD-10-CM

## 2024-06-14 DIAGNOSIS — I26.99 OTHER PULMONARY EMBOLISM W/OUT ACUTE COR PULMONALE: ICD-10-CM

## 2024-06-14 PROCEDURE — 95012 NITRIC OXIDE EXP GAS DETER: CPT

## 2024-06-14 PROCEDURE — 94010 BREATHING CAPACITY TEST: CPT

## 2024-06-14 PROCEDURE — 99214 OFFICE O/P EST MOD 30 MIN: CPT | Mod: 25

## 2024-06-14 RX ORDER — AMLODIPINE BESYLATE 5 MG/1
5 TABLET ORAL DAILY
Qty: 30 | Refills: 1 | Status: ACTIVE | COMMUNITY
Start: 2024-06-14 | End: 1900-01-01

## 2024-06-15 PROBLEM — I26.99 PULMONARY EMBOLISM: Status: ACTIVE | Noted: 2024-05-21

## 2024-06-15 PROBLEM — R05.9 COUGH: Status: ACTIVE | Noted: 2024-05-22
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
## 2024-06-15 NOTE — PROCEDURE
[FreeTextEntry1] : Spirometry is within normal limits. ___  Exhaled Nitric Oxide             Final  No Documents Attached    	Test  	Result  	Flag	Reference	Goal	Last Verified  	Exhaled Nitric Oxide	23	 	 		REQUIRED  Ordered by: JAYE BEE       Collected/Examined: 14Jun2024 10:34AM       Verification Required       Stage: Final       Performed at: In Office       Performed by: JAYE BEE       Resulted: 14Jun2024 10:34AM       Last Updated: 14Jun2024 10:34AM

## 2024-06-15 NOTE — ASSESSMENT
[FreeTextEntry1] : Obtained and reviewed spirometry, NIOX results with patient today.  Discontinue Metoprolol. Prescribed Amlodipine 5 mg 1 pill once a day for Hypertension. Advised patient to follow-up with his PCP regarding hypertension. Continue Symbicort 160/4.5 mcg HFA, 2 puffs twice a day for asthma treatment. Continue Eliquis for recent diagnosed PE for a total of 6 months at this point.   Return for pulmonary follow up in 1 month.

## 2024-06-15 NOTE — ADDENDUM
[FreeTextEntry1] : I, Barb Frias, acted solely as a scribe for Dr. Naheed Sosa D.O., on this date 06/14/2024.   All medical record entries made by the Scribe were at my, Dr. Naheed Sosa D.O., direction and personally dictated by me on 06/14/2024. I have reviewed the chart and agree that the record accurately reflects my personal performance of the history, physical exam, assessment and plan. I have also personally directed, reviewed, and agreed with the chart.

## 2024-06-15 NOTE — HISTORY OF PRESENT ILLNESS
[Former] : former [TextBox_4] : TIMI GUERRERO is a 53 year old male, with history of asthma, Pulmonary embolism, hypertension, who presents to the office for follow up evaluation. Patient reports that his cough has not changed since his last visit. He denies of having any symptoms of dyspnea, wheezing, or chest pain. Patient reports that he continues to take Symbicort on a daily basis for asthma treatment. He also states that he continues to take Eliquis. [TextBox_13] : 8

## 2024-06-26 LAB
CULTURE RESULTS: SIGNIFICANT CHANGE UP
SPECIMEN SOURCE: SIGNIFICANT CHANGE UP

## 2024-07-03 ENCOUNTER — APPOINTMENT (OUTPATIENT)
Dept: INFECTIOUS DISEASE | Facility: CLINIC | Age: 54
End: 2024-07-03

## 2024-07-12 ENCOUNTER — APPOINTMENT (OUTPATIENT)
Dept: PULMONOLOGY | Facility: CLINIC | Age: 54
End: 2024-07-12

## 2024-07-12 ENCOUNTER — APPOINTMENT (OUTPATIENT)
Dept: PULMONOLOGY | Facility: CLINIC | Age: 54
End: 2024-07-12
Payer: MEDICAID

## 2024-07-12 VITALS
RESPIRATION RATE: 16 BRPM | OXYGEN SATURATION: 98 % | HEART RATE: 91 BPM | DIASTOLIC BLOOD PRESSURE: 95 MMHG | SYSTOLIC BLOOD PRESSURE: 146 MMHG

## 2024-07-12 DIAGNOSIS — I26.99 OTHER PULMONARY EMBOLISM W/OUT ACUTE COR PULMONALE: ICD-10-CM

## 2024-07-12 DIAGNOSIS — J45.909 UNSPECIFIED ASTHMA, UNCOMPLICATED: ICD-10-CM

## 2024-07-12 DIAGNOSIS — R05.9 COUGH, UNSPECIFIED: ICD-10-CM

## 2024-07-12 DIAGNOSIS — I10 ESSENTIAL (PRIMARY) HYPERTENSION: ICD-10-CM

## 2024-07-12 PROCEDURE — 94060 EVALUATION OF WHEEZING: CPT

## 2024-07-12 PROCEDURE — 99214 OFFICE O/P EST MOD 30 MIN: CPT | Mod: 25

## 2024-07-12 PROCEDURE — 94727 GAS DIL/WSHOT DETER LNG VOL: CPT

## 2024-07-12 PROCEDURE — 95012 NITRIC OXIDE EXP GAS DETER: CPT

## 2024-07-12 PROCEDURE — 94729 DIFFUSING CAPACITY: CPT

## 2024-08-09 ENCOUNTER — RX RENEWAL (OUTPATIENT)
Age: 54
End: 2024-08-09

## 2024-09-14 NOTE — PROVIDER CONTACT NOTE (CRITICAL VALUE NOTIFICATION) - RECOMMENDATIONS
Follow nomogram protocol. [TextEntry] : Mohs Surgery Procedure Note   A surgical time out was taken to confirm the patient's correct identity and the correct site. The operative site was identified by the patient and surgical team prior to surgery and the patient agreed to proceed with the surgical site which was marked prior to anesthesia infiltration.   Mohs Micrographic Surgery Report Date Collected: 09/13/2024 Date Received: Same as above Date Verified: Same as above Attending Surgeon: Meagan Bell MD Fellow: Jil Vazquez MD Assistants: Ambreen Womack RN, Aubree Ambriz MA Procedure#:  Diagnosis: Invasive SCC Location: Right distal pretibial Pre-op Size: 2.3 cm x1.3 cm Post-Operative Final Defect Size: 3.0 cm x 2.0 cm Stages (number of pieces per stage):1 (2/1) Pathology, if tumor noted in stages: Debulk with atypical keratinocytic proliferation consistent with invasive SCC. Stage I with Sparse perivascular lymphocytic infiltrate and no evidence of residual carcinoma.  Indications for procedure: Recurrent tumor, Ill-defined tumor margins, high-priority anatomic location for preservation of normal tissue, aggressive histologic nature of the tumor Repair type: N/A (second intent) Total volume of anesthesia: 12 mL    Mohs Procedure Report:   The patient was escorted to the outpatient surgical operatory. The proposed Mohs procedure and reconstruction options were discussed with the patient. The risks, benefits, and alternatives were discussed and the patient signed a written consent form. A time out was taken to confirm the patient's identity and the exact location of the skin cancer. After the patient was placed in a recumbent position, the surgical site was cleaned with alcohol and either Betadine (for eyes and ears) or chlorhexidine gluconate, draped, and infiltrated with 0.5% lidocaine with 1:200,000 epinephrine local anesthetic. A sterile surgical marking pen was used to outline a thin margin of normal-appearing skin around the tumor. A beveled incision was then made using a scalpel. Small orienting nicks were made on the specimen and the surrounding skin. The tissue was then sharply dissected from the surrounding skin. Hemostasis was maintained with the electrosurgical unit and/or pressure. A temporary dressing was placed on the surgical defect until the frozen section slides were interpreted. The oriented specimen was placed in a Joanne dish and transported to the Mohs laboratory. For each stage of the procedure, a visual representation of the specimen was drawn on a Mohs map. This map graphically depicts the specimen's two-dimensional appearance, orientation, and tissue preparation, which consists of dividing the specimen and applying tissue dyes. Because the deep and peripheral portions of the tissue are then embedded in the same geometric plane, the map also represents an oriented scale drawing of the resulting histologic sections. The Mohs technician then prepared frozen section slides using standard techniques. The slides were stained with hematoxylin and eosin, and cover slips were applied. The frozen section slides were then examined under the microscope. If tumor was found, it was localized on the map. The orienting nicks on the original specimen corresponded to similar nicks on the surgical defect so areas of identified tumor could be mapped back to the patient and resected. Additional layers of removed skin were then processed as indicated above. This iterative process continued as applicable until no tumor was observed microscopically. At this stage, the Mohs resection was complete.  Second Intention Healing After the surgical procedure was completed, the patient was brought back to the minor surgery operatory. Various reconstructive modalities were discussed with the patient. Second intention healing was selected as the best option. A pressure dressing composed of Vaseline ointment, Telfa, and sterile gauze and was securely taped into place. The patient was given complete verbal and written wound care instructions and was asked to follow up for a wound check as indicated. The patient ambulated from the minor surgery operatory without problems.

## 2024-10-11 ENCOUNTER — APPOINTMENT (OUTPATIENT)
Dept: PULMONOLOGY | Facility: CLINIC | Age: 54
End: 2024-10-11

## 2025-03-27 NOTE — ED ADULT NURSE NOTE - NSFALLUNIVINTERV_ED_ALL_ED
Sudden numbness or weakness of the face, arm, or leg, especially on one side of the body. Confusion, trouble speaking or understanding. Trouble seeing in one or both eyes. Trouble walking, dizziness, loss of balance or coordination. Severe headache.
Bed/Stretcher in lowest position, wheels locked, appropriate side rails in place/Call bell, personal items and telephone in reach/Instruct patient to call for assistance before getting out of bed/chair/stretcher/Non-slip footwear applied when patient is off stretcher/[place] to call system/Physically safe environment - no spills, clutter or unnecessary equipment/Purposeful proactive rounding/Room/bathroom lighting operational, light cord in reach

## (undated) DEVICE — STAPLER SKIN VISI-STAT 35 WIDE

## (undated) DEVICE — SOL IRR POUR NS 0.9% 1500ML

## (undated) DEVICE — LIJ-ZIMMER MESHGRAFTER: Type: DURABLE MEDICAL EQUIPMENT

## (undated) DEVICE — CANISTER DISPOSABLE THIN WALL 3000CC

## (undated) DEVICE — DRSG XEROFORM 5 X 9"

## (undated) DEVICE — DRSG KERLIX ROLL 4.5"

## (undated) DEVICE — DRAPE INSTRUMENT POUCH 6.75" X 11"

## (undated) DEVICE — GLV 8 PROTEXIS (WHITE)

## (undated) DEVICE — SUT CHROMIC 4-0 27" SH

## (undated) DEVICE — ELCTR GROUNDING PAD ADULT COVIDIEN

## (undated) DEVICE — PACK MAJOR ABDOMINAL WITH LAP

## (undated) DEVICE — ELCTR BOVIE TIP BLADE INSULATED 2.75" EDGE

## (undated) DEVICE — VENODYNE/SCD SLEEVE CALF MEDIUM

## (undated) DEVICE — POSITIONER STRAP ARMBOARD VELCRO TS-30

## (undated) DEVICE — LABELS BLANK W PEN

## (undated) DEVICE — SOL IRR POUR H2O 1500ML

## (undated) DEVICE — DRSG ADAPTIC 3 X 8"

## (undated) DEVICE — DRAPE SPLIT SHEET 77" X 108"

## (undated) DEVICE — WARMING BLANKET UPPER ADULT

## (undated) DEVICE — DRSG VAC GRANUFOAM SMALL (BLACK)

## (undated) DEVICE — ZIMMER BLADE DERMATONE

## (undated) DEVICE — HANDPIECE INTERPULSE W/ COAXIAL FAN SPRAY TIP